# Patient Record
Sex: FEMALE | Race: BLACK OR AFRICAN AMERICAN | NOT HISPANIC OR LATINO
[De-identification: names, ages, dates, MRNs, and addresses within clinical notes are randomized per-mention and may not be internally consistent; named-entity substitution may affect disease eponyms.]

---

## 2017-01-11 ENCOUNTER — FORM ENCOUNTER (OUTPATIENT)
Age: 62
End: 2017-01-11

## 2017-01-12 ENCOUNTER — MEDICATION RENEWAL (OUTPATIENT)
Age: 62
End: 2017-01-12

## 2017-01-12 ENCOUNTER — OUTPATIENT (OUTPATIENT)
Dept: OUTPATIENT SERVICES | Facility: HOSPITAL | Age: 62
LOS: 1 days | End: 2017-01-12
Payer: COMMERCIAL

## 2017-01-12 ENCOUNTER — APPOINTMENT (OUTPATIENT)
Dept: MRI IMAGING | Facility: IMAGING CENTER | Age: 62
End: 2017-01-12

## 2017-01-12 ENCOUNTER — APPOINTMENT (OUTPATIENT)
Dept: INTERNAL MEDICINE | Facility: CLINIC | Age: 62
End: 2017-01-12

## 2017-01-12 VITALS
BODY MASS INDEX: 46.25 KG/M2 | HEART RATE: 66 BPM | DIASTOLIC BLOOD PRESSURE: 88 MMHG | HEIGHT: 63 IN | WEIGHT: 261 LBS | SYSTOLIC BLOOD PRESSURE: 146 MMHG

## 2017-01-12 DIAGNOSIS — G62.9 POLYNEUROPATHY, UNSPECIFIED: ICD-10-CM

## 2017-01-12 DIAGNOSIS — M54.16 RADICULOPATHY, LUMBAR REGION: ICD-10-CM

## 2017-01-12 PROCEDURE — 72148 MRI LUMBAR SPINE W/O DYE: CPT

## 2017-02-19 ENCOUNTER — FORM ENCOUNTER (OUTPATIENT)
Age: 62
End: 2017-02-19

## 2017-02-20 ENCOUNTER — APPOINTMENT (OUTPATIENT)
Dept: MRI IMAGING | Facility: CLINIC | Age: 62
End: 2017-02-20

## 2017-02-20 ENCOUNTER — OUTPATIENT (OUTPATIENT)
Dept: OUTPATIENT SERVICES | Facility: HOSPITAL | Age: 62
LOS: 1 days | End: 2017-02-20
Payer: COMMERCIAL

## 2017-02-20 DIAGNOSIS — M54.12 RADICULOPATHY, CERVICAL REGION: ICD-10-CM

## 2017-02-22 ENCOUNTER — RX RENEWAL (OUTPATIENT)
Age: 62
End: 2017-02-22

## 2017-02-24 ENCOUNTER — MESSAGE (OUTPATIENT)
Age: 62
End: 2017-02-24

## 2017-03-16 ENCOUNTER — RX RENEWAL (OUTPATIENT)
Age: 62
End: 2017-03-16

## 2017-03-21 ENCOUNTER — MESSAGE (OUTPATIENT)
Age: 62
End: 2017-03-21

## 2017-03-21 ENCOUNTER — MEDICATION RENEWAL (OUTPATIENT)
Age: 62
End: 2017-03-21

## 2017-04-13 PROCEDURE — 72141 MRI NECK SPINE W/O DYE: CPT

## 2017-06-30 ENCOUNTER — RX RENEWAL (OUTPATIENT)
Age: 62
End: 2017-06-30

## 2017-07-24 ENCOUNTER — APPOINTMENT (OUTPATIENT)
Dept: CT IMAGING | Facility: IMAGING CENTER | Age: 62
End: 2017-07-24

## 2017-07-24 ENCOUNTER — OUTPATIENT (OUTPATIENT)
Dept: OUTPATIENT SERVICES | Facility: HOSPITAL | Age: 62
LOS: 1 days | End: 2017-07-24
Payer: COMMERCIAL

## 2017-07-24 DIAGNOSIS — I67.1 CEREBRAL ANEURYSM, NONRUPTURED: ICD-10-CM

## 2017-07-24 PROCEDURE — 82565 ASSAY OF CREATININE: CPT

## 2017-07-24 PROCEDURE — 70496 CT ANGIOGRAPHY HEAD: CPT

## 2017-09-13 ENCOUNTER — RX RENEWAL (OUTPATIENT)
Age: 62
End: 2017-09-13

## 2017-09-15 ENCOUNTER — MEDICATION RENEWAL (OUTPATIENT)
Age: 62
End: 2017-09-15

## 2017-09-21 ENCOUNTER — MEDICATION RENEWAL (OUTPATIENT)
Age: 62
End: 2017-09-21

## 2017-10-11 ENCOUNTER — MEDICATION RENEWAL (OUTPATIENT)
Age: 62
End: 2017-10-11

## 2018-01-10 ENCOUNTER — MESSAGE (OUTPATIENT)
Age: 63
End: 2018-01-10

## 2018-01-29 ENCOUNTER — MEDICATION RENEWAL (OUTPATIENT)
Age: 63
End: 2018-01-29

## 2018-04-20 ENCOUNTER — RX RENEWAL (OUTPATIENT)
Age: 63
End: 2018-04-20

## 2018-05-04 ENCOUNTER — INPATIENT (INPATIENT)
Facility: HOSPITAL | Age: 63
LOS: 5 days | Discharge: HOME HEALTH | End: 2018-05-10
Attending: SURGERY | Admitting: SURGERY
Payer: COMMERCIAL

## 2018-05-04 VITALS
OXYGEN SATURATION: 98 % | SYSTOLIC BLOOD PRESSURE: 157 MMHG | TEMPERATURE: 98 F | DIASTOLIC BLOOD PRESSURE: 107 MMHG | RESPIRATION RATE: 16 BRPM | HEART RATE: 87 BPM

## 2018-05-04 DIAGNOSIS — K46.0 UNSPECIFIED ABDOMINAL HERNIA WITH OBSTRUCTION, WITHOUT GANGRENE: ICD-10-CM

## 2018-05-04 LAB
ALBUMIN SERPL ELPH-MCNC: 4.2 G/DL — SIGNIFICANT CHANGE UP (ref 3.3–5)
ALP SERPL-CCNC: 93 U/L — SIGNIFICANT CHANGE UP (ref 40–120)
ALT FLD-CCNC: 30 U/L — SIGNIFICANT CHANGE UP (ref 4–33)
APPEARANCE UR: CLEAR — SIGNIFICANT CHANGE UP
APTT BLD: 31.2 SEC — SIGNIFICANT CHANGE UP (ref 27.5–37.4)
AST SERPL-CCNC: 41 U/L — HIGH (ref 4–32)
BASE EXCESS BLDV CALC-SCNC: 3.5 MMOL/L — SIGNIFICANT CHANGE UP
BASOPHILS # BLD AUTO: 0.05 K/UL — SIGNIFICANT CHANGE UP (ref 0–0.2)
BASOPHILS NFR BLD AUTO: 0.4 % — SIGNIFICANT CHANGE UP (ref 0–2)
BILIRUB SERPL-MCNC: 0.4 MG/DL — SIGNIFICANT CHANGE UP (ref 0.2–1.2)
BILIRUB UR-MCNC: NEGATIVE — SIGNIFICANT CHANGE UP
BLD GP AB SCN SERPL QL: NEGATIVE — SIGNIFICANT CHANGE UP
BLOOD GAS VENOUS - CREATININE: 0.67 MG/DL — SIGNIFICANT CHANGE UP (ref 0.5–1.3)
BLOOD UR QL VISUAL: NEGATIVE — SIGNIFICANT CHANGE UP
BUN SERPL-MCNC: 12 MG/DL — SIGNIFICANT CHANGE UP (ref 7–23)
CALCIUM SERPL-MCNC: 8.7 MG/DL — SIGNIFICANT CHANGE UP (ref 8.4–10.5)
CHLORIDE BLDV-SCNC: 106 MMOL/L — SIGNIFICANT CHANGE UP (ref 96–108)
CHLORIDE SERPL-SCNC: 100 MMOL/L — SIGNIFICANT CHANGE UP (ref 98–107)
CO2 SERPL-SCNC: 24 MMOL/L — SIGNIFICANT CHANGE UP (ref 22–31)
COLOR SPEC: YELLOW — SIGNIFICANT CHANGE UP
CREAT SERPL-MCNC: 0.73 MG/DL — SIGNIFICANT CHANGE UP (ref 0.5–1.3)
EOSINOPHIL # BLD AUTO: 0.19 K/UL — SIGNIFICANT CHANGE UP (ref 0–0.5)
EOSINOPHIL NFR BLD AUTO: 1.5 % — SIGNIFICANT CHANGE UP (ref 0–6)
GAS PNL BLDV: 138 MMOL/L — SIGNIFICANT CHANGE UP (ref 136–146)
GLUCOSE BLDV-MCNC: 117 — HIGH (ref 70–99)
GLUCOSE SERPL-MCNC: 111 MG/DL — HIGH (ref 70–99)
GLUCOSE UR-MCNC: NEGATIVE — SIGNIFICANT CHANGE UP
HCO3 BLDV-SCNC: 27 MMOL/L — SIGNIFICANT CHANGE UP (ref 20–27)
HCT VFR BLD CALC: 43 % — SIGNIFICANT CHANGE UP (ref 34.5–45)
HCT VFR BLDV CALC: 41.9 % — SIGNIFICANT CHANGE UP (ref 34.5–45)
HGB BLD-MCNC: 13.2 G/DL — SIGNIFICANT CHANGE UP (ref 11.5–15.5)
HGB BLDV-MCNC: 13.6 G/DL — SIGNIFICANT CHANGE UP (ref 11.5–15.5)
IMM GRANULOCYTES # BLD AUTO: 0.05 # — SIGNIFICANT CHANGE UP
IMM GRANULOCYTES NFR BLD AUTO: 0.4 % — SIGNIFICANT CHANGE UP (ref 0–1.5)
INR BLD: 1.04 — SIGNIFICANT CHANGE UP (ref 0.88–1.17)
KETONES UR-MCNC: NEGATIVE — SIGNIFICANT CHANGE UP
LACTATE BLDV-MCNC: 1.1 MMOL/L — SIGNIFICANT CHANGE UP (ref 0.5–2)
LEUKOCYTE ESTERASE UR-ACNC: NEGATIVE — SIGNIFICANT CHANGE UP
LYMPHOCYTES # BLD AUTO: 17.5 % — SIGNIFICANT CHANGE UP (ref 13–44)
LYMPHOCYTES # BLD AUTO: 2.25 K/UL — SIGNIFICANT CHANGE UP (ref 1–3.3)
MCHC RBC-ENTMCNC: 21.7 PG — LOW (ref 27–34)
MCHC RBC-ENTMCNC: 30.7 % — LOW (ref 32–36)
MCV RBC AUTO: 70.8 FL — LOW (ref 80–100)
MONOCYTES # BLD AUTO: 0.7 K/UL — SIGNIFICANT CHANGE UP (ref 0–0.9)
MONOCYTES NFR BLD AUTO: 5.5 % — SIGNIFICANT CHANGE UP (ref 2–14)
MUCOUS THREADS # UR AUTO: SIGNIFICANT CHANGE UP
NEUTROPHILS # BLD AUTO: 9.6 K/UL — HIGH (ref 1.8–7.4)
NEUTROPHILS NFR BLD AUTO: 74.7 % — SIGNIFICANT CHANGE UP (ref 43–77)
NITRITE UR-MCNC: NEGATIVE — SIGNIFICANT CHANGE UP
NON-SQ EPI CELLS # UR AUTO: <1 — SIGNIFICANT CHANGE UP
NRBC # FLD: 0 — SIGNIFICANT CHANGE UP
PCO2 BLDV: 44 MMHG — SIGNIFICANT CHANGE UP (ref 41–51)
PH BLDV: 7.42 PH — SIGNIFICANT CHANGE UP (ref 7.32–7.43)
PH UR: 6.5 — SIGNIFICANT CHANGE UP (ref 4.6–8)
PLATELET # BLD AUTO: 256 K/UL — SIGNIFICANT CHANGE UP (ref 150–400)
PMV BLD: 11 FL — SIGNIFICANT CHANGE UP (ref 7–13)
PO2 BLDV: 40 MMHG — SIGNIFICANT CHANGE UP (ref 35–40)
POTASSIUM BLDV-SCNC: 3.9 MMOL/L — SIGNIFICANT CHANGE UP (ref 3.4–4.5)
POTASSIUM SERPL-MCNC: 4 MMOL/L — SIGNIFICANT CHANGE UP (ref 3.5–5.3)
POTASSIUM SERPL-SCNC: 4 MMOL/L — SIGNIFICANT CHANGE UP (ref 3.5–5.3)
PROT SERPL-MCNC: 7.7 G/DL — SIGNIFICANT CHANGE UP (ref 6–8.3)
PROT UR-MCNC: 20 MG/DL — SIGNIFICANT CHANGE UP
PROTHROM AB SERPL-ACNC: 11.6 SEC — SIGNIFICANT CHANGE UP (ref 9.8–13.1)
RBC # BLD: 6.07 M/UL — HIGH (ref 3.8–5.2)
RBC # FLD: 18.6 % — HIGH (ref 10.3–14.5)
RBC CASTS # UR COMP ASSIST: SIGNIFICANT CHANGE UP (ref 0–?)
RH IG SCN BLD-IMP: POSITIVE — SIGNIFICANT CHANGE UP
SAO2 % BLDV: 71.2 % — SIGNIFICANT CHANGE UP (ref 60–85)
SODIUM SERPL-SCNC: 139 MMOL/L — SIGNIFICANT CHANGE UP (ref 135–145)
SP GR SPEC: 1.02 — SIGNIFICANT CHANGE UP (ref 1–1.04)
SQUAMOUS # UR AUTO: SIGNIFICANT CHANGE UP
UROBILINOGEN FLD QL: NORMAL MG/DL — SIGNIFICANT CHANGE UP
WBC # BLD: 12.84 K/UL — HIGH (ref 3.8–10.5)
WBC # FLD AUTO: 12.84 K/UL — HIGH (ref 3.8–10.5)
WBC UR QL: SIGNIFICANT CHANGE UP (ref 0–?)

## 2018-05-04 PROCEDURE — 72192 CT PELVIS W/O DYE: CPT | Mod: 26,59

## 2018-05-04 PROCEDURE — 74177 CT ABD & PELVIS W/CONTRAST: CPT | Mod: 26

## 2018-05-04 RX ORDER — HEPARIN SODIUM 5000 [USP'U]/ML
5000 INJECTION INTRAVENOUS; SUBCUTANEOUS EVERY 8 HOURS
Qty: 0 | Refills: 0 | Status: DISCONTINUED | OUTPATIENT
Start: 2018-05-04 | End: 2018-05-07

## 2018-05-04 RX ORDER — ACETAMINOPHEN 500 MG
1000 TABLET ORAL ONCE
Qty: 0 | Refills: 0 | Status: COMPLETED | OUTPATIENT
Start: 2018-05-04 | End: 2018-05-04

## 2018-05-04 RX ORDER — HYDROCHLOROTHIAZIDE 25 MG
12.5 TABLET ORAL DAILY
Qty: 0 | Refills: 0 | Status: DISCONTINUED | OUTPATIENT
Start: 2018-05-04 | End: 2018-05-07

## 2018-05-04 RX ORDER — SODIUM CHLORIDE 9 MG/ML
1000 INJECTION INTRAMUSCULAR; INTRAVENOUS; SUBCUTANEOUS ONCE
Qty: 0 | Refills: 0 | Status: COMPLETED | OUTPATIENT
Start: 2018-05-04 | End: 2018-05-04

## 2018-05-04 RX ORDER — AMLODIPINE BESYLATE 2.5 MG/1
5 TABLET ORAL DAILY
Qty: 0 | Refills: 0 | Status: DISCONTINUED | OUTPATIENT
Start: 2018-05-04 | End: 2018-05-05

## 2018-05-04 RX ORDER — SODIUM CHLORIDE 9 MG/ML
1000 INJECTION, SOLUTION INTRAVENOUS
Qty: 0 | Refills: 0 | Status: DISCONTINUED | OUTPATIENT
Start: 2018-05-04 | End: 2018-05-05

## 2018-05-04 RX ORDER — PIPERACILLIN AND TAZOBACTAM 4; .5 G/20ML; G/20ML
3.38 INJECTION, POWDER, LYOPHILIZED, FOR SOLUTION INTRAVENOUS ONCE
Qty: 0 | Refills: 0 | Status: COMPLETED | OUTPATIENT
Start: 2018-05-04 | End: 2018-05-04

## 2018-05-04 RX ADMIN — Medication 1000 MILLIGRAM(S): at 23:29

## 2018-05-04 RX ADMIN — SODIUM CHLORIDE 500 MILLILITER(S): 9 INJECTION INTRAMUSCULAR; INTRAVENOUS; SUBCUTANEOUS at 13:21

## 2018-05-04 RX ADMIN — PIPERACILLIN AND TAZOBACTAM 200 GRAM(S): 4; .5 INJECTION, POWDER, LYOPHILIZED, FOR SOLUTION INTRAVENOUS at 20:20

## 2018-05-04 RX ADMIN — Medication 400 MILLIGRAM(S): at 20:04

## 2018-05-04 NOTE — ED PROVIDER NOTE - MEDICAL DECISION MAKING DETAILS
pt with abd hernia  tender to palpation  not reducible on my exam  labs including lactate sent  CT ordered   surgery called to evaluate (and they did prior to CT)  CT c/w incarcerated hernia  IV antibiotic given  surgery called to reevaluate

## 2018-05-04 NOTE — H&P ADULT - ATTENDING COMMENTS
pt seen and examined. hernia partially reduced on rpt ct with descreased pain and +BM/Flatus.  admit for observation, npo, ivf, tentative or mon for VHR.  re-evaluate for emergent repair if pain worsens or signs of recurrent LBO.

## 2018-05-04 NOTE — ED PROVIDER NOTE - OBJECTIVE STATEMENT
Pt with chronic hernia  low abd with mild baseline discomfort which became more intense over the last 2 days  She denies vomiting or fever and still is moving her bowels  She has no cardiopulmonary complaints   She denies radiation of pain to the back and denies recent straining that may have precipitated the pain

## 2018-05-04 NOTE — H&P ADULT - HISTORY OF PRESENT ILLNESS
63 yo female presents to the ER with abdominal pain. She states that over the past 4 days she has had worsening abdominal pain. She has had pain to a lesser extent in the past. Normally she is able to massage her stomach and the pain improves. Over the past couple of days the pain has gotten substantially worse. She last had a bowel movement and flatus on Wednesday. She denies fevers or chills. She has had no nausea or vomiting, however does note decreased PO intake.

## 2018-05-04 NOTE — ED ADULT TRIAGE NOTE - CHIEF COMPLAINT QUOTE
pt c/o abdominal pain and distention x 4-5 days worse since yesterday, decreased po intake, intermittent nausea, last bowel movement 2 days ago. denies any fevers, vomiting, diarrhea, chest pain. pmhx: neuropathy, htn, cad, , possible hernia pt c/o abdominal pain and distention x 4-5 days worse since yesterday, decreased po intake, intermittent nausea, last bowel movement 2 days ago. denies any fevers, vomiting, diarrhea, chest pain. pmhx: neuropathy, htn, cad, , possible hernia.

## 2018-05-04 NOTE — ED PROVIDER NOTE - PROGRESS NOTE DETAILS
MARIELA Bro - pt signed out to me at the shift change. incarcerated hernia, reduced by surgery, they want repeat Ct abd/pelvis non-con - called CT upstairs, will take pt now.

## 2018-05-04 NOTE — H&P ADULT - NSHPLABSRESULTS_GEN_ALL_CORE
CBC Full  -  ( 04 May 2018 13:12 )  WBC Count : 12.84 K/uL  Hemoglobin : 13.2 g/dL  Hematocrit : 43.0 %  Platelet Count - Automated : 256 K/uL  Mean Cell Volume : 70.8 fL  Mean Cell Hemoglobin : 21.7 pg  Mean Cell Hemoglobin Concentration : 30.7 %  Auto Neutrophil # : 9.60 K/uL  Auto Lymphocyte # : 2.25 K/uL  Auto Monocyte # : 0.70 K/uL  Auto Eosinophil # : 0.19 K/uL  Auto Basophil # : 0.05 K/uL  Auto Neutrophil % : 74.7 %  Auto Lymphocyte % : 17.5 %  Auto Monocyte % : 5.5 %  Auto Eosinophil % : 1.5 %  Auto Basophil % : 0.4 %      05-04    139  |  100  |  12  ----------------------------<  111<H>  4.0   |  24  |  0.73    Ca    8.7      04 May 2018 13:12    TPro  7.7  /  Alb  4.2  /  TBili  0.4  /  DBili  x   /  AST  41<H>  /  ALT  30  /  AlkPhos  93  05-04    CT abdomen/pelvis:   There is fluid stranding along the herniated portions of the transverse colon. There is complete collapse of the distal colon to the level of the rectum. Transition to nondistended colon is noted at the level of the hernia (series 601 image 110).

## 2018-05-04 NOTE — ED ADULT NURSE NOTE - OBJECTIVE STATEMENT
pt A&Ox3 c/o left lower quadrant pain pt states she has a hernia and it has been increasing in size, pt reports decreased appteitie, abdomen soft and tender to touch, denies n/v/d. pt sent to rw

## 2018-05-04 NOTE — ED ADULT NURSE NOTE - CHIEF COMPLAINT QUOTE
pt c/o abdominal pain and distention x 4-5 days worse since yesterday, decreased po intake, intermittent nausea, last bowel movement 2 days ago. denies any fevers, vomiting, diarrhea, chest pain. pmhx: neuropathy, htn, cad, , possible hernia.

## 2018-05-04 NOTE — H&P ADULT - NSHPPHYSICALEXAM_GEN_ALL_CORE
Gen: NAD  HEENT: no scleral injection, EOMI, no neck masses  CV: S1/S2  Resp: clear to auscultation bilaterally  Abdomen: soft, bulge over left periumbilical region, tenderness, no erythema  Ext: warm well perfused

## 2018-05-05 LAB
BACTERIA UR CULT: SIGNIFICANT CHANGE UP
BLD GP AB SCN SERPL QL: NEGATIVE — SIGNIFICANT CHANGE UP
BUN SERPL-MCNC: 9 MG/DL — SIGNIFICANT CHANGE UP (ref 7–23)
CALCIUM SERPL-MCNC: 8.4 MG/DL — SIGNIFICANT CHANGE UP (ref 8.4–10.5)
CHLORIDE SERPL-SCNC: 102 MMOL/L — SIGNIFICANT CHANGE UP (ref 98–107)
CO2 SERPL-SCNC: 26 MMOL/L — SIGNIFICANT CHANGE UP (ref 22–31)
CREAT SERPL-MCNC: 0.71 MG/DL — SIGNIFICANT CHANGE UP (ref 0.5–1.3)
GLUCOSE SERPL-MCNC: 100 MG/DL — HIGH (ref 70–99)
HCT VFR BLD CALC: 39.7 % — SIGNIFICANT CHANGE UP (ref 34.5–45)
HGB BLD-MCNC: 12.1 G/DL — SIGNIFICANT CHANGE UP (ref 11.5–15.5)
LACTATE SERPL-SCNC: 0.8 MMOL/L — SIGNIFICANT CHANGE UP (ref 0.5–2)
MCHC RBC-ENTMCNC: 21.8 PG — LOW (ref 27–34)
MCHC RBC-ENTMCNC: 30.5 % — LOW (ref 32–36)
MCV RBC AUTO: 71.7 FL — LOW (ref 80–100)
NRBC # FLD: 0 — SIGNIFICANT CHANGE UP
PLATELET # BLD AUTO: 233 K/UL — SIGNIFICANT CHANGE UP (ref 150–400)
PMV BLD: 11.8 FL — SIGNIFICANT CHANGE UP (ref 7–13)
POTASSIUM SERPL-MCNC: 3.6 MMOL/L — SIGNIFICANT CHANGE UP (ref 3.5–5.3)
POTASSIUM SERPL-SCNC: 3.6 MMOL/L — SIGNIFICANT CHANGE UP (ref 3.5–5.3)
RBC # BLD: 5.54 M/UL — HIGH (ref 3.8–5.2)
RBC # FLD: 17.4 % — HIGH (ref 10.3–14.5)
RH IG SCN BLD-IMP: POSITIVE — SIGNIFICANT CHANGE UP
SODIUM SERPL-SCNC: 139 MMOL/L — SIGNIFICANT CHANGE UP (ref 135–145)
SPECIMEN SOURCE: SIGNIFICANT CHANGE UP
WBC # BLD: 10.93 K/UL — HIGH (ref 3.8–10.5)
WBC # FLD AUTO: 10.93 K/UL — HIGH (ref 3.8–10.5)

## 2018-05-05 RX ORDER — AMLODIPINE BESYLATE 2.5 MG/1
5 TABLET ORAL
Qty: 0 | Refills: 0 | Status: DISCONTINUED | OUTPATIENT
Start: 2018-05-05 | End: 2018-05-07

## 2018-05-05 RX ORDER — ACETAMINOPHEN 500 MG
650 TABLET ORAL ONCE
Qty: 0 | Refills: 0 | Status: COMPLETED | OUTPATIENT
Start: 2018-05-05 | End: 2018-05-05

## 2018-05-05 RX ORDER — GABAPENTIN 400 MG/1
300 CAPSULE ORAL
Qty: 0 | Refills: 0 | Status: DISCONTINUED | OUTPATIENT
Start: 2018-05-05 | End: 2018-05-07

## 2018-05-05 RX ORDER — POTASSIUM CHLORIDE 20 MEQ
10 PACKET (EA) ORAL ONCE
Qty: 0 | Refills: 0 | Status: COMPLETED | OUTPATIENT
Start: 2018-05-05 | End: 2018-05-05

## 2018-05-05 RX ORDER — GABAPENTIN 400 MG/1
100 CAPSULE ORAL ONCE
Qty: 0 | Refills: 0 | Status: COMPLETED | OUTPATIENT
Start: 2018-05-05 | End: 2018-05-05

## 2018-05-05 RX ORDER — GABAPENTIN 400 MG/1
100 CAPSULE ORAL THREE TIMES A DAY
Qty: 0 | Refills: 0 | Status: DISCONTINUED | OUTPATIENT
Start: 2018-05-05 | End: 2018-05-05

## 2018-05-05 RX ORDER — ACETAMINOPHEN 500 MG
1000 TABLET ORAL ONCE
Qty: 0 | Refills: 0 | Status: COMPLETED | OUTPATIENT
Start: 2018-05-05 | End: 2018-05-05

## 2018-05-05 RX ORDER — GABAPENTIN 400 MG/1
1 CAPSULE ORAL
Qty: 0 | Refills: 0 | COMMUNITY

## 2018-05-05 RX ADMIN — AMLODIPINE BESYLATE 5 MILLIGRAM(S): 2.5 TABLET ORAL at 05:18

## 2018-05-05 RX ADMIN — Medication 650 MILLIGRAM(S): at 23:03

## 2018-05-05 RX ADMIN — Medication 650 MILLIGRAM(S): at 22:28

## 2018-05-05 RX ADMIN — HEPARIN SODIUM 5000 UNIT(S): 5000 INJECTION INTRAVENOUS; SUBCUTANEOUS at 05:18

## 2018-05-05 RX ADMIN — Medication 100 MILLIEQUIVALENT(S): at 17:34

## 2018-05-05 RX ADMIN — GABAPENTIN 300 MILLIGRAM(S): 400 CAPSULE ORAL at 17:33

## 2018-05-05 RX ADMIN — Medication 1000 MILLIGRAM(S): at 10:00

## 2018-05-05 RX ADMIN — SODIUM CHLORIDE 75 MILLILITER(S): 9 INJECTION, SOLUTION INTRAVENOUS at 17:34

## 2018-05-05 RX ADMIN — SODIUM CHLORIDE 125 MILLILITER(S): 9 INJECTION, SOLUTION INTRAVENOUS at 09:41

## 2018-05-05 RX ADMIN — Medication 12.5 MILLIGRAM(S): at 05:18

## 2018-05-05 RX ADMIN — GABAPENTIN 300 MILLIGRAM(S): 400 CAPSULE ORAL at 05:18

## 2018-05-05 RX ADMIN — GABAPENTIN 100 MILLIGRAM(S): 400 CAPSULE ORAL at 01:06

## 2018-05-05 RX ADMIN — Medication 400 MILLIGRAM(S): at 09:41

## 2018-05-05 RX ADMIN — SODIUM CHLORIDE 125 MILLILITER(S): 9 INJECTION, SOLUTION INTRAVENOUS at 00:11

## 2018-05-05 NOTE — PATIENT PROFILE ADULT. - MEDICATION ADMINISTRATION INFO, PROFILE
no concerns
DVT: HSQ  Diet: NPO with PEG feeds.  GOC: Spoke to family about prognosis, would want full code.

## 2018-05-05 NOTE — PROVIDER CONTACT NOTE (OTHER) - ASSESSMENT
Pt complaining of neuropathic pain on bilateral hands and feet. Pt requesting home dose of gabapentin 300mg. Otherwise asymptomatic.

## 2018-05-05 NOTE — PROGRESS NOTE ADULT - SUBJECTIVE AND OBJECTIVE BOX
GENERAL SURGERY DAILY PROGRESS NOTE      Subjective:  Admitted overnight for incarcerated ventral hernia. Hernia reduced at bedside. This AM pt feels well overall. States pain has improved since admission. Passed flatus this AM.         Objective:    PE:  Gen: Laying in bed, in NAD  Resp: clear to auscultation bilaterally  Abdomen: soft, bulge over left periumbilical region, tenderness, no erythema  Ext: warm well perfused    Vital Signs Last 24 Hrs  T(C): 37.1 (05 May 2018 05:15), Max: 37.6 (05 May 2018 00:06)  T(F): 98.7 (05 May 2018 05:15), Max: 99.6 (05 May 2018 00:06)  HR: 65 (05 May 2018 05:15) (65 - 91)  BP: 136/90 (05 May 2018 05:15) (134/92 - 166/76)  BP(mean): --  RR: 18 (05 May 2018 05:15) (16 - 18)  SpO2: 97% (05 May 2018 05:15) (97% - 100%)    I&O's Detail    04 May 2018 07:01  -  05 May 2018 07:00  --------------------------------------------------------  IN:    lactated ringers.: 750 mL    Oral Fluid: 100 mL  Total IN: 850 mL    OUT:    Voided: 400 mL  Total OUT: 400 mL    Total NET: 450 mL          Daily Height in cm: 162.56 (05 May 2018 00:06)    Daily     MEDICATIONS  (STANDING):  amLODIPine   Tablet 5 milliGRAM(s) Oral daily  gabapentin 300 milliGRAM(s) Oral two times a day  heparin  Injectable 5000 Unit(s) SubCutaneous every 8 hours  hydrochlorothiazide 12.5 milliGRAM(s) Oral daily  lactated ringers. 1000 milliLiter(s) (125 mL/Hr) IV Continuous <Continuous>    MEDICATIONS  (PRN):      LABS:                        12.1   10.93 )-----------( 233      ( 05 May 2018 06:00 )             39.7     05-04    139  |  100  |  12  ----------------------------<  111<H>  4.0   |  24  |  0.73    Ca    8.7      04 May 2018 13:12    TPro  7.7  /  Alb  4.2  /  TBili  0.4  /  DBili  x   /  AST  41<H>  /  ALT  30  /  AlkPhos  93  05-04    PT/INR - ( 04 May 2018 20:00 )   PT: 11.6 SEC;   INR: 1.04          PTT - ( 04 May 2018 20:00 )  PTT:31.2 SEC  Urinalysis Basic - ( 04 May 2018 13:12 )    Color: YELLOW / Appearance: CLEAR / S.025 / pH: 6.5  Gluc: NEGATIVE / Ketone: NEGATIVE  / Bili: NEGATIVE / Urobili: NORMAL mg/dL   Blood: NEGATIVE / Protein: 20 mg/dL / Nitrite: NEGATIVE   Leuk Esterase: NEGATIVE / RBC: 0-2 / WBC 2-5   Sq Epi: OCC / Non Sq Epi: x / Bacteria: x        RADIOLOGY & ADDITIONAL STUDIES:

## 2018-05-05 NOTE — PROGRESS NOTE ADULT - ASSESSMENT
61 yo female s/p 4 c-sections with incarcerated ventral hernia, s/p bedside reduction, currently hemodynamically stable  - Monitor GI function  - Continue NPO/IVF for now  - F/U AM labs  - DVT ppx 61 yo female s/p 4 c-sections with incarcerated ventral hernia, s/p bedside reduction, currently hemodynamically stable  - Monitor GI function  - advance to clears for now  - F/U AM labs  - DVT ppx  - plan for OR mon for chronically incarcerated VHR unless pain worsens or recurrent signs of LBO

## 2018-05-06 ENCOUNTER — TRANSCRIPTION ENCOUNTER (OUTPATIENT)
Age: 63
End: 2018-05-06

## 2018-05-06 LAB
BUN SERPL-MCNC: 14 MG/DL — SIGNIFICANT CHANGE UP (ref 7–23)
CALCIUM SERPL-MCNC: 8.8 MG/DL — SIGNIFICANT CHANGE UP (ref 8.4–10.5)
CHLORIDE SERPL-SCNC: 101 MMOL/L — SIGNIFICANT CHANGE UP (ref 98–107)
CO2 SERPL-SCNC: 28 MMOL/L — SIGNIFICANT CHANGE UP (ref 22–31)
CREAT SERPL-MCNC: 0.79 MG/DL — SIGNIFICANT CHANGE UP (ref 0.5–1.3)
GLUCOSE SERPL-MCNC: 102 MG/DL — HIGH (ref 70–99)
HCT VFR BLD CALC: 40.2 % — SIGNIFICANT CHANGE UP (ref 34.5–45)
HGB BLD-MCNC: 12.3 G/DL — SIGNIFICANT CHANGE UP (ref 11.5–15.5)
MAGNESIUM SERPL-MCNC: 2.2 MG/DL — SIGNIFICANT CHANGE UP (ref 1.6–2.6)
MCHC RBC-ENTMCNC: 21.9 PG — LOW (ref 27–34)
MCHC RBC-ENTMCNC: 30.6 % — LOW (ref 32–36)
MCV RBC AUTO: 71.7 FL — LOW (ref 80–100)
NRBC # FLD: 0 — SIGNIFICANT CHANGE UP
PHOSPHATE SERPL-MCNC: 4.7 MG/DL — HIGH (ref 2.5–4.5)
PLATELET # BLD AUTO: 233 K/UL — SIGNIFICANT CHANGE UP (ref 150–400)
PMV BLD: 11.1 FL — SIGNIFICANT CHANGE UP (ref 7–13)
POTASSIUM SERPL-MCNC: 3.8 MMOL/L — SIGNIFICANT CHANGE UP (ref 3.5–5.3)
POTASSIUM SERPL-SCNC: 3.8 MMOL/L — SIGNIFICANT CHANGE UP (ref 3.5–5.3)
RBC # BLD: 5.61 M/UL — HIGH (ref 3.8–5.2)
RBC # FLD: 18.4 % — HIGH (ref 10.3–14.5)
SODIUM SERPL-SCNC: 141 MMOL/L — SIGNIFICANT CHANGE UP (ref 135–145)
WBC # BLD: 11.15 K/UL — HIGH (ref 3.8–10.5)
WBC # FLD AUTO: 11.15 K/UL — HIGH (ref 3.8–10.5)

## 2018-05-06 PROCEDURE — 71045 X-RAY EXAM CHEST 1 VIEW: CPT | Mod: 26

## 2018-05-06 PROCEDURE — 99223 1ST HOSP IP/OBS HIGH 75: CPT | Mod: AI,GC

## 2018-05-06 RX ORDER — ACETAMINOPHEN 500 MG
650 TABLET ORAL EVERY 6 HOURS
Qty: 0 | Refills: 0 | Status: DISCONTINUED | OUTPATIENT
Start: 2018-05-06 | End: 2018-05-07

## 2018-05-06 RX ORDER — SODIUM CHLORIDE 9 MG/ML
1000 INJECTION, SOLUTION INTRAVENOUS
Qty: 0 | Refills: 0 | Status: DISCONTINUED | OUTPATIENT
Start: 2018-05-06 | End: 2018-05-07

## 2018-05-06 RX ADMIN — Medication 650 MILLIGRAM(S): at 15:43

## 2018-05-06 RX ADMIN — Medication 12.5 MILLIGRAM(S): at 05:26

## 2018-05-06 RX ADMIN — HEPARIN SODIUM 5000 UNIT(S): 5000 INJECTION INTRAVENOUS; SUBCUTANEOUS at 15:25

## 2018-05-06 RX ADMIN — AMLODIPINE BESYLATE 5 MILLIGRAM(S): 2.5 TABLET ORAL at 18:04

## 2018-05-06 RX ADMIN — Medication 650 MILLIGRAM(S): at 15:23

## 2018-05-06 RX ADMIN — HEPARIN SODIUM 5000 UNIT(S): 5000 INJECTION INTRAVENOUS; SUBCUTANEOUS at 05:26

## 2018-05-06 RX ADMIN — GABAPENTIN 300 MILLIGRAM(S): 400 CAPSULE ORAL at 18:04

## 2018-05-06 RX ADMIN — GABAPENTIN 300 MILLIGRAM(S): 400 CAPSULE ORAL at 05:26

## 2018-05-06 RX ADMIN — AMLODIPINE BESYLATE 5 MILLIGRAM(S): 2.5 TABLET ORAL at 05:26

## 2018-05-06 RX ADMIN — HEPARIN SODIUM 5000 UNIT(S): 5000 INJECTION INTRAVENOUS; SUBCUTANEOUS at 22:03

## 2018-05-06 NOTE — CONSULT NOTE ADULT - SUBJECTIVE AND OBJECTIVE BOX
Date of Admission:    Patient is a 62y old  Female who presents with a chief complaint of abdominal pain (04 May 2018 22:21)      HISTORY OF PRESENT ILLNESS:       Allergies    latex (Rash; Hives)  Percocet 10/325 (Hives)    Intolerances    	    MEDICATIONS:  Tylenol PRN  Amlodipine 5mg BID  Gabapentin 100mg TID  HCTZ/Losartan 12.5mg/50mg qd  Zolpidem ER 12.5mg qhs         Inpatient Meds:   amLODIPine   Tablet 5 milliGRAM(s) Oral <User Schedule>  heparin  Injectable 5000 Unit(s) SubCutaneous every 8 hours  hydrochlorothiazide 12.5 milliGRAM(s) Oral daily        gabapentin 300 milliGRAM(s) Oral two times a day        dextrose 5% + sodium chloride 0.3%. 1000 milliLiter(s) IV Continuous <Continuous>      PAST MEDICAL & SURGICAL HISTORY:  CAD (coronary artery disease)  Neuropathy of foot  Obesity  HTN (hypertension)  History of  section      FAMILY HISTORY:  No pertinent family history in first degree relatives      SOCIAL HISTORY:    [ ] Non-smoker  [ ] Smoker  [ ] Alcohol      REVIEW OF SYSTEMS:    CONSTITUTIONAL: No weakness, fevers or chills  EYES/ENT: No visual changes;  No dysphagia  NECK: No pain or stiffness  RESPIRATORY: No cough, wheezing, hemoptysis; No shortness of breath  CARDIOVASCULAR: No chest pain or palpitations; No lower extremity edema  GASTROINTESTINAL: No abdominal or epigastric pain. No nausea, vomiting, or hematemesis; No diarrhea or constipation. No melena or hematochezia.  BACK: No back pain  GENITOURINARY: No dysuria, frequency or hematuria  NEUROLOGICAL: No numbness or weakness  SKIN: No itching, burning, rashes, or lesions   All other review of systems is negative unless indicated above.  PHYSICAL EXAM:  T(C): 36.7 (18 @ 09:30), Max: 37.4 (18 @ 22:04)  HR: 89 (18 @ 09:30) (74 - 89)  BP: 151/86 (18 @ 09:30) (113/72 - 151/86)  RR: 18 (18 @ 09:30) (17 - 18)  SpO2: 96% (18 @ 09:30) (96% - 97%)  Wt(kg): --  I&O's Summary    05 May 2018 07:01  -  06 May 2018 07:00  --------------------------------------------------------  IN: 780 mL / OUT: 2250 mL / NET: -1470 mL        Appearance: Normal	  HEENT:   Normal oral mucosa, PERRL, EOMI	  Lymphatic: No lymphadenopathy  Cardiovascular: Normal S1 S2, No JVD, No murmurs, No edema  Respiratory: Lungs clear to auscultation	  Psychiatry: A & O x 3, Mood & affect appropriate  Gastrointestinal:  Soft, Non-tender, + BS	  Skin: No rashes, No ecchymoses, No cyanosis	  Neurologic: Non-focal  Extremities: Normal range of motion, No clubbing, cyanosis or edema  Vascular: Peripheral pulses palpable 2+ bilaterally        LABS:	 	    CBC Full  -  ( 06 May 2018 06:00 )  WBC Count : 11.15 K/uL  Hemoglobin : 12.3 g/dL  Hematocrit : 40.2 %  Platelet Count - Automated : 233 K/uL  Mean Cell Volume : 71.7 fL  Mean Cell Hemoglobin : 21.9 pg  Mean Cell Hemoglobin Concentration : 30.6 %  Auto Neutrophil # : x  Auto Lymphocyte # : x  Auto Monocyte # : x  Auto Eosinophil # : x  Auto Basophil # : x  Auto Neutrophil % : x  Auto Lymphocyte % : x  Auto Monocyte % : x  Auto Eosinophil % : x  Auto Basophil % : x        141  |  101  |  14  ----------------------------<  102<H>  3.8   |  28  |  0.79      139  |  102  |  9   ----------------------------<  100<H>  3.6   |  26  |  0.71    Ca    8.8      06 May 2018 06:00  Ca    8.4      05 May 2018 06:00  Phos  4.7     05-  Mg     2.2     05-06    TPro  7.7  /  Alb  4.2  /  TBili  0.4  /  DBili  x   /  AST  41<H>  /  ALT  30  /  AlkPhos  93        proBNP:   Lipid Profile:   HgA1c:   TSH:       CARDIAC MARKERS:            TELEMETRY: 	    ECG:  	  RADIOLOGY:  OTHER: 	    PREVIOUS DIAGNOSTIC TESTING:    [ ] Echocardiogram:  [ ]  Catheterization:  [ ] Stress Test:  	  	  ASSESSMENT/PLAN: Date of Admission:    Patient is a 62y old  Female who presents with a chief complaint of abdominal pain (04 May 2018 22:21)      HISTORY OF PRESENT ILLNESS:     61yo woman w/PMHx palpitations, HTN, neuropathy, Morbid Obesity, s/p vertical incision multiple C. sections p/w abdominal pain a/w incarcerated ventral incisional hernia s/p manual reduction with plan for OR  for ventral hernia repair cardiology consulted for preoperative risk assessment. Reports FATIMA at 1 flight of stairs and with minimal exertion, stable since last stress and cath. No associated CP, nausea/vomiting, lightheadedness. Occasional palpitations.         Allergies    latex (Rash; Hives)  Percocet 10/325 (Hives)    Intolerances    	    MEDICATIONS:  Tylenol PRN  Amlodipine 5mg BID  Gabapentin 100mg TID  HCTZ/Losartan 12.5mg/50mg qd  Zolpidem ER 12.5mg qhs         Inpatient Meds:   amLODIPine   Tablet 5 milliGRAM(s) Oral <User Schedule>  heparin  Injectable 5000 Unit(s) SubCutaneous every 8 hours  hydrochlorothiazide 12.5 milliGRAM(s) Oral daily  gabapentin 300 milliGRAM(s) Oral two times a day  dextrose 5% + sodium chloride 0.3%. 1000 milliLiter(s) IV Continuous <Continuous>      PAST MEDICAL & SURGICAL HISTORY:  Neuropathy of foot  Obesity  HTN (hypertension)  History of  section      FAMILY HISTORY:  No pertinent family history in first degree relatives      SOCIAL HISTORY:    [ ] Non-smoker        REVIEW OF SYSTEMS:    CONSTITUTIONAL: No weakness, fevers or chills  EYES/ENT: No visual changes;  No dysphagia  NECK: No pain or stiffness  RESPIRATORY: No cough, wheezing, hemoptysis;  CARDIOVASCULAR: No chest pain or palpitations; No lower extremity edema  GASTROINTESTINAL. No nausea, vomiting, or hematemesis; No diarrhea or constipation. No melena or hematochezia.  BACK: No back pain  GENITOURINARY: No dysuria, frequency or hematuria  NEUROLOGICAL: No numbness or weakness  SKIN: No itching, burning, rashes, or lesions   All other review of systems is negative unless indicated above.    PHYSICAL EXAM:  T(C): 36.7 (18 @ 09:30), Max: 37.4 (18 @ 22:04)  HR: 89 (18 @ 09:30) (74 - 89)  BP: 151/86 (18 @ 09:30) (113/72 - 151/86)  RR: 18 (18 @ 09:30) (17 - 18)  SpO2: 96% (18 @ 09:30) (96% - 97%)  Wt(kg): --  I&O's Summary    05 May 2018 07:01  -  06 May 2018 07:00  --------------------------------------------------------  IN: 780 mL / OUT: 2250 mL / NET: -1470 mL        Appearance: Normal, morbidly obese, resting comfortably in no distress	  HEENT:   Normal oral mucosa, PERRL, EOMI	  Lymphatic: No lymphadenopathy  Cardiovascular: Normal S1 S2, No JVD, No murmurs, No edema  Respiratory: Lungs clear to auscultation	  Psychiatry: A & O x 3, Mood & affect appropriate  Gastrointestinal:  Soft, Non-tender, + BS	  Skin: No rashes, No ecchymoses, No cyanosis	  Neurologic: Non-focal  Extremities: Normal range of motion, No clubbing, cyanosis or edema  Vascular: Peripheral pulses palpable 2+ bilaterally        LABS:	 	    CBC Full  -  ( 06 May 2018 06:00 )  WBC Count : 11.15 K/uL  Hemoglobin : 12.3 g/dL  Hematocrit : 40.2 %  Platelet Count - Automated : 233 K/uL  Mean Cell Volume : 71.7 fL  Mean Cell Hemoglobin : 21.9 pg  Mean Cell Hemoglobin Concentration : 30.6 %          141  |  101  |  14  ----------------------------<  102<H>  3.8   |  28  |  0.79      139  |  102  |  9   ----------------------------<  100<H>  3.6   |  26  |  0.71    Ca    8.8      06 May 2018 06:00  Ca    8.4      05 May 2018 06:00  Phos  4.7       Mg     2.2         TPro  7.7  /  Alb  4.2  /  TBili  0.4  /  DBili  x   /  AST  41<H>  /  ALT  30  /  AlkPhos  93  05-04      ECG:  	NSR HR 90 no acute ST changes    RADIOLOGY:   CT Abd/Pelvis; personally reviewed lower chest no noted effusions    PREVIOUS DIAGNOSTIC TESTING:    [ ] Echocardiogram: < from: Transthoracic Echocardiogram (02.19.15 @ 13:30) >  CONCLUSIONS:  1. Mitral annular calcification. Minimal mitral  regurgitation.  2. Normal trileaflet aortic valve. No aortic valve  regurgitation seen.  3. Normal left ventricular internal dimensions and wall  thickness.  4. Mild global left ventricular systolic dysfunction. EF  50%.  5. Normal right atrium.  6. Normal right ventricular size and function.  7. Estimated right ventricular systolic pressure equals 27  mm Hg, assuming right atrial pressure equals 10 mm Hg,  consistent with normal pulmonary pressures.  8. Minimal tricuspid regurgitation.      [ ]  Catheterization: < from: Cardiac Cath Lab (13 @ 09:00) >  CORONARY VESSELS: The coronary circulation is left dominant.  LM:   --  LM: Normal.  LAD:   --  LAD: Normal.  CX:   --  Circumflex: Normal.  RCA:   --  RCA: Normal.  COMPLICATIONS: There were no complications.  DIAGNOSTIC RECOMMENDATIONS: The patient should continue with the present  medications.  Prepared and signed by  Danny Duncan M.D.    	  ASSESSMENT/PLAN: 	  61yo woman w/PMHx palpitations, HTN, neuropathy, Morbid Obesity, s/p vertical incision multiple C. sections p/w abdominal pain a/w incarcerated ventral incisional hernia s/p manual reduction with plan for OR  for ventral hernia repair cardiology consulted for preoperative risk assessment.    RCRI 1 (given intraperitoneal surgery) Risk of major adverse cardiac events is <1%. Despite METS<4. No further cardiac evaluation is required prior to procedure. Last cath in  without CAD. Prior TTE demonstrated mild global LV dysfunction. No symptoms of CHF and no evidence of volume overload on exam  - HTN c/w antihypertensives    Will d/w Dr. Chavez

## 2018-05-06 NOTE — CONSULT NOTE ADULT - ASSESSMENT
62 y.o. female PMH HTN, neuropathy, Morbid Obesity, s/p vertical incision C-sections x 4 p/w abdominal pain a/w incarcerated ventral incisional hernia s/p reduction.  Patient is scheduled for ventral hernia repair Monday, May 7.    #  Pre-op Evaluation.  - Transthoracic Echocardiogram  - Given complicated and unclear cardiac history and poor exercise tolerance, Consider Cardiology clearance prior to surgery    Please reconsult with further questions.  Pager # [phone]

## 2018-05-06 NOTE — CONSULT NOTE ADULT - ATTENDING COMMENTS
Agree with above. May proceed with planned procedure.
62 y.o. Female w/ hx Obesity, HTN, subdural hematome 2015, small brain aneursym, CAD , ? Arrthymia, thalaseemia minor p/w incarcerated ventral hernia for surgery pending clearance. Patient gets SOB on a daily basis when ambulating up flight of stairs but no chest pain. She had a cardiac cath a few years ago which showed mild systolic dysfxn but clean coronaries. Would recommend Cardiology clearance since its unclear what the arrythmia she has is and EKG now shows sinus rhythm. Would check TTE prior to surgery.

## 2018-05-06 NOTE — CHART NOTE - NSCHARTNOTEFT_GEN_A_CORE
Surgery Preop Note    Patient is a 62y old  Female who presents with a chief complaint of abdominal pain (04 May 2018 22:21)    Diagnosis: Incarcerated ventral hernia  Procedure: Ventral hernia repair, possible mesh  Surgeon: Leon                          [id number]   11.15 )-----------( 233      ( 06 May 2018 06:00 )             40.2     05-06    141  |  101  |  14  ----------------------------<  102<H>  3.8   |  28  |  0.79    Ca    8.8      06 May 2018 06:00  Phos  4.7     05-  Mg     2.2     -06    TPro  7.7  /  Alb  4.2  /  TBili  0.4  /  DBili  x   /  AST  41<H>  /  ALT  30  /  AlkPhos  93  05-04    PT/INR - ( 04 May 2018 20:00 )   PT: 11.6 SEC;   INR: 1.04          PTT - ( 04 May 2018 20:00 )  PTT:31.2 SEC  ABO Interpretation: B ( @ 06:00)  ABO Interpretation: B ( @ 12:38)    Urinalysis Basic - ( 04 May 2018 13:12 )    Color: YELLOW / Appearance: CLEAR / S.025 / pH: 6.5  Gluc: NEGATIVE / Ketone: NEGATIVE  / Bili: NEGATIVE / Urobili: NORMAL mg/dL   Blood: NEGATIVE / Protein: 20 mg/dL / Nitrite: NEGATIVE   Leuk Esterase: NEGATIVE / RBC: 0-2 / WBC 2-5   Sq Epi: OCC / Non Sq Epi: x / Bacteria: x      Chest X RAY (18): Clear lung fields    12 Lead ECG (18 @ 11:06)   Normal sinus rhythm  Left axis deviation  Incomplete left bundle branch block           MEDICATIONS  (STANDING):  amLODIPine   Tablet 5 milliGRAM(s) Oral <User Schedule>  dextrose 5% + sodium chloride 0.3%. 1000 milliLiter(s) (75 mL/Hr) IV Continuous <Continuous>  gabapentin 300 milliGRAM(s) Oral two times a day  heparin  Injectable 5000 Unit(s) SubCutaneous every 8 hours  hydrochlorothiazide 12.5 milliGRAM(s) Oral daily    MEDICATIONS  (PRN):      Assessment & Plan:  62y Female PMH CAD, presented with incarcerated ventral hernia, currently hemodynamically stable  - NPOpMN/IVF  - T&S, Serum HCG  - Pre-op labs obtained  - Appreciate medical clearance  - Consent signed, in chart  - OR tomorrow for hernia repair

## 2018-05-06 NOTE — PROGRESS NOTE ADULT - SUBJECTIVE AND OBJECTIVE BOX
General Surgery Progress Note  AILIN ALLISON    S: No acute events overnight. Denies N/V and fevers/chills. Tolerated a regular diet yesterday.    O:  T(C): 36.9 (05-06-18 @ 02:01), Max: 37.4 (05-05-18 @ 22:04)  HR: 82 (05-06-18 @ 02:01) (65 - 85)  BP: 113/72 (05-06-18 @ 02:01) (113/72 - 143/82)  RR: 18 (05-06-18 @ 02:01) (17 - 18)  SpO2: 96% (05-06-18 @ 02:01) (96% - 98%)        Physical Exam:  Gen: Laying in bed, in NAD  Resp: clear to auscultation bilaterally  Abdomen: soft, bulge over left periumbilical region, tenderness, no erythema  Ext: warm well perfused      A/P: 61 yo female s/p 4 c-sections with incarcerated ventral hernia, s/p bedside reduction, currently hemodynamically stable    - Regular diet: NPOpMN for OR, IVF  - Consent in chart  - Will obtain medical clearance today, will call PMD  - Monitor GI fxn, monitor for signs of LBO  - DVT ppx  - Dispo: Floor, OR Monday

## 2018-05-06 NOTE — CONSULT NOTE ADULT - SUBJECTIVE AND OBJECTIVE BOX
HPI:  62 y.o. female PMH HTN, neuropathy, Morbid Obesity, s/p vertical incision C-sections x 4 p/w abdominal pain a/w incarcerated ventral incisional hernia s/p reduction.  Patient is scheduled for ventral hernia repair Monday, May 7.    Patient says she is greatly deconditioned and gets short of breath while walking up one flight of stairs.  She was told by her PCP that she has an arrhythmia and a heart murmur, not otherwise specified.  She says she was getting chest pains a year ago and got a stress test and an Echocardiogram, was told they were unconcerning but she did not know details.  In [place] we have a Cardiac Cath from 2013 showing clean coronaries.    No personal or family history of bleeding disorder or bad reaction to anesthesia.    PAST MEDICAL & SURGICAL HISTORY:  Subdural Hematoma ()  Thalassemia minor  Carpal Tunnel Syndrome  Neuropathy of foot  Obesity  HTN (hypertension)  History of  section x 4      FAMILY HISTORY:  Mother - Breast Cancer, Heart Disease NOS      SOCIAL HISTORY:  Smoking:  Never  Occupation:  8th grade       Allergies    latex (Rash; Hives)  Percocet 10/325 (Hives)    Intolerances        HOME MEDICATIONS:    REVIEW OF SYSTEMS:  Constitutional: No fevers or chills. No weight loss. No fatigue or generalized malaise.  Eyes: No itching or discharge from the eyes  ENT: No ear pain. No ear discharge. No nasal congestion. No post nasal drip. No epistaxis. No throat pain. No sore throat. No difficulty swallowing.   CV: No chest pain. No palpitations. No lightheadedness or dizziness.   Resp: No dyspnea at rest.   Does endorse dyspnea on exertion. No orthopnea. No wheezing. No cough  GI: No nausea. No vomiting. No diarrhea.  Abdominal pain greatly improved after hernia reduction, but .  MSK: No joint pain or pain in any extremities  Integumentary: No skin lesions. No pedal edema.  Neurological: No gross motor weakness.  Chronic stocking-glove parasthesias.      OBJECTIVE:  ICU Vital Signs Last 24 Hrs  T(C): 36.7 (06 May 2018 09:30), Max: 37.4 (05 May 2018 22:04)  T(F): 98.1 (06 May 2018 09:30), Max: 99.4 (05 May 2018 22:04)  HR: 89 (06 May 2018 09:30) (74 - 89)  BP: 151/86 (06 May 2018 09:30) (113/72 - 151/86)  BP(mean): --  ABP: --  ABP(mean): --  RR: 18 (06 May 2018 09:30) (17 - 18)  SpO2: 96% (06 May 2018 09:30) (96% - 97%)        05-05 @ 07:01  -  05- @ 07:00  --------------------------------------------------------  IN: 780 mL / OUT: 2250 mL / NET: -1470 mL      CAPILLARY BLOOD GLUCOSE          PHYSICAL EXAM:  General: Awake, alert, oriented X 3.   Morbidly Obese  HEENT:   Malampatti Grade II.  No nasal congestion.  No tonsillar or pharyngeal exudates.  MMM.  Neck:  Unable to appreciate JVD given body habitus  Respiratory: Normal chest expansion.  Normal and equal air entry.  No wheeze, rhonchi or rales.  Cardiovascular: S1 S2 normal. No murmurs, rubs or gallops.   Abdomen:  Obese, midline vertical laparotomy scar inferior to umbilicus.  Soft, moderate periumbilical tenderness without guarding or rebound.  tender, non-distended. Bowels Sounds normoactive.    Extremities: Warm to touch.  No pedal edema.   Skin: No rashes or skin lesions  Neurological:  Nonfocal  Psychiatry: Appropriate mood and affect.    HOSPITAL MEDICATIONS:  MEDICATIONS  (STANDING):  amLODIPine   Tablet 5 milliGRAM(s) Oral <User Schedule>  dextrose 5% + sodium chloride 0.3%. 1000 milliLiter(s) (75 mL/Hr) IV Continuous <Continuous>  gabapentin 300 milliGRAM(s) Oral two times a day  heparin  Injectable 5000 Unit(s) SubCutaneous every 8 hours  hydrochlorothiazide 12.5 milliGRAM(s) Oral daily    MEDICATIONS  (PRN):      LABS:                        12.3   11.15 )-----------( 233      ( 06 May 2018 06:00 )             40.2     05-06    141  |  101  |  14  ----------------------------<  102<H>  3.8   |  28  |  0.79    Ca    8.8      06 May 2018 06:00  Phos  4.7     05-06  Mg     2.2     05-06    TPro  7.7  /  Alb  4.2  /  TBili  0.4  /  DBili  x   /  AST  41<H>  /  ALT  30  /  AlkPhos  93  05-04    PT/INR - ( 04 May 2018 20:00 )   PT: 11.6 SEC;   INR: 1.04          PTT - ( 04 May 2018 20:00 )  PTT:31.2 SEC  Urinalysis Basic - ( 04 May 2018 13:12 )    Color: YELLOW / Appearance: CLEAR / S.025 / pH: 6.5  Gluc: NEGATIVE / Ketone: NEGATIVE  / Bili: NEGATIVE / Urobili: NORMAL mg/dL   Blood: NEGATIVE / Protein: 20 mg/dL / Nitrite: NEGATIVE   Leuk Esterase: NEGATIVE / RBC: 0-2 / WBC 2-5   Sq Epi: OCC / Non Sq Epi: x / Bacteria: x        Venous Blood Gas:   @ 13:12  7.42/44/40/27/71.2  VBG Lactate: 1.1      RADIOLOGY:  [X] Reviewed and interpreted by me:  Chest Xray from today shows possible increased cephalad vascular markings in right lung field, normal heart size, sharp costophrenic angles.    EKG:  Normal sinus rhythm, left axis deviation.  No T-wave inversions, ST-elevations, or ST-depressions. HPI:  62 y.o. female PMH HTN, neuropathy, Morbid Obesity, s/p vertical incision C-sections x 4 p/w abdominal pain a/w incarcerated ventral incisional hernia s/p reduction.  Patient is scheduled for ventral hernia repair Monday, May 7.    Patient says she is greatly deconditioned and gets short of breath while walking up one flight of stairs.  She was told by her PCP that she has an arrhythmia and a heart murmur, not otherwise specified.  She says she was getting chest pains a year ago and got a stress test and an Echocardiogram, was told they were unconcerning but she did not know details.  In [place] we have a Cardiac Cath from 2013 showing clean coronaries.    No personal or family history of bleeding disorder or bad reaction to anesthesia.    PAST MEDICAL & SURGICAL HISTORY:  Subdural Hematoma ()  Thalassemia minor  Carpal Tunnel Syndrome  Neuropathy of foot  Obesity  HTN (hypertension)  History of  section x 4      FAMILY HISTORY:  Mother - Breast Cancer, Heart Disease NOS      SOCIAL HISTORY:  Smoking:  Never  Occupation:  8th grade       Allergies    latex (Rash; Hives)  Percocet 10/325 (Hives)    Intolerances        HOME MEDICATIONS:  Amlodipine  Gabapentin      REVIEW OF SYSTEMS:  Constitutional: No fevers or chills. No weight loss. No fatigue or generalized malaise.  Eyes: No itching or discharge from the eyes  ENT: No ear pain. No ear discharge. No nasal congestion. No post nasal drip. No epistaxis. No throat pain. No sore throat. No difficulty swallowing.   CV: No chest pain. No palpitations. No lightheadedness or dizziness.   Resp: No dyspnea at rest.   Does endorse dyspnea on exertion. No orthopnea. No wheezing. No cough  GI: No nausea. No vomiting. No diarrhea.  Abdominal pain greatly improved after hernia reduction, but .  MSK: No joint pain or pain in any extremities  Integumentary: No skin lesions. No pedal edema.  Neurological: No gross motor weakness.  Chronic stocking-glove parasthesias.      OBJECTIVE:  ICU Vital Signs Last 24 Hrs  T(C): 36.7 (06 May 2018 09:30), Max: 37.4 (05 May 2018 22:04)  T(F): 98.1 (06 May 2018 09:30), Max: 99.4 (05 May 2018 22:04)  HR: 89 (06 May 2018 09:30) (74 - 89)  BP: 151/86 (06 May 2018 09:30) (113/72 - 151/86)  BP(mean): --  ABP: --  ABP(mean): --  RR: 18 (06 May 2018 09:30) (17 - 18)  SpO2: 96% (06 May 2018 09:30) (96% - 97%)        05-05 @ 07:01  -  05- @ 07:00  --------------------------------------------------------  IN: 780 mL / OUT: 2250 mL / NET: -1470 mL      CAPILLARY BLOOD GLUCOSE          PHYSICAL EXAM:  General: Awake, alert, oriented X 3.   Morbidly Obese  HEENT:   Malampatti Grade II.  No nasal congestion.  No tonsillar or pharyngeal exudates.  MMM.  Neck:  Unable to appreciate JVD given body habitus  Respiratory: Normal chest expansion.  Normal and equal air entry.  No wheeze, rhonchi or rales.  Cardiovascular: S1 S2 normal. No murmurs, rubs or gallops.   Abdomen:  Obese, midline vertical laparotomy scar inferior to umbilicus.  Soft, moderate periumbilical tenderness without guarding or rebound.  tender, non-distended. Bowels Sounds normoactive.    Extremities: Warm to touch.  No pedal edema.   Skin: No rashes or skin lesions  Neurological:  Nonfocal  Psychiatry: Appropriate mood and affect.    HOSPITAL MEDICATIONS:  MEDICATIONS  (STANDING):  amLODIPine   Tablet 5 milliGRAM(s) Oral <User Schedule>  dextrose 5% + sodium chloride 0.3%. 1000 milliLiter(s) (75 mL/Hr) IV Continuous <Continuous>  gabapentin 300 milliGRAM(s) Oral two times a day  heparin  Injectable 5000 Unit(s) SubCutaneous every 8 hours  hydrochlorothiazide 12.5 milliGRAM(s) Oral daily    MEDICATIONS  (PRN):      LABS:                        12.3   11.15 )-----------( 233      ( 06 May 2018 06:00 )             40.2     05-06    141  |  101  |  14  ----------------------------<  102<H>  3.8   |  28  |  0.79    Ca    8.8      06 May 2018 06:00  Phos  4.7     05-06  Mg     2.2     05-06    TPro  7.7  /  Alb  4.2  /  TBili  0.4  /  DBili  x   /  AST  41<H>  /  ALT  30  /  AlkPhos  93  05-04    PT/INR - ( 04 May 2018 20:00 )   PT: 11.6 SEC;   INR: 1.04          PTT - ( 04 May 2018 20:00 )  PTT:31.2 SEC  Urinalysis Basic - ( 04 May 2018 13:12 )    Color: YELLOW / Appearance: CLEAR / S.025 / pH: 6.5  Gluc: NEGATIVE / Ketone: NEGATIVE  / Bili: NEGATIVE / Urobili: NORMAL mg/dL   Blood: NEGATIVE / Protein: 20 mg/dL / Nitrite: NEGATIVE   Leuk Esterase: NEGATIVE / RBC: 0-2 / WBC 2-5   Sq Epi: OCC / Non Sq Epi: x / Bacteria: x        Venous Blood Gas:   @ 13:12  7.42/44/40/27/71.2  VBG Lactate: 1.1      RADIOLOGY:  [X] Reviewed and interpreted by me:  Chest Xray from today shows possible increased cephalad vascular markings in right lung field, normal heart size, sharp costophrenic angles.    EKG:  Normal sinus rhythm, left axis deviation.  No T-wave inversions, ST-elevations, or ST-depressions.

## 2018-05-07 ENCOUNTER — RESULT REVIEW (OUTPATIENT)
Age: 63
End: 2018-05-07

## 2018-05-07 ENCOUNTER — TRANSCRIPTION ENCOUNTER (OUTPATIENT)
Age: 63
End: 2018-05-07

## 2018-05-07 LAB
BUN SERPL-MCNC: 15 MG/DL — SIGNIFICANT CHANGE UP (ref 7–23)
CALCIUM SERPL-MCNC: 8.8 MG/DL — SIGNIFICANT CHANGE UP (ref 8.4–10.5)
CHLORIDE SERPL-SCNC: 101 MMOL/L — SIGNIFICANT CHANGE UP (ref 98–107)
CO2 SERPL-SCNC: 26 MMOL/L — SIGNIFICANT CHANGE UP (ref 22–31)
CREAT SERPL-MCNC: 0.79 MG/DL — SIGNIFICANT CHANGE UP (ref 0.5–1.3)
GLUCOSE SERPL-MCNC: 108 MG/DL — HIGH (ref 70–99)
HCG SERPL-ACNC: < 5 MIU/ML — SIGNIFICANT CHANGE UP
HCT VFR BLD CALC: 41.5 % — SIGNIFICANT CHANGE UP (ref 34.5–45)
HGB BLD-MCNC: 12.7 G/DL — SIGNIFICANT CHANGE UP (ref 11.5–15.5)
INR BLD: 0.88 — SIGNIFICANT CHANGE UP (ref 0.88–1.17)
MAGNESIUM SERPL-MCNC: 2.2 MG/DL — SIGNIFICANT CHANGE UP (ref 1.6–2.6)
MCHC RBC-ENTMCNC: 21.9 PG — LOW (ref 27–34)
MCHC RBC-ENTMCNC: 30.6 % — LOW (ref 32–36)
MCV RBC AUTO: 71.7 FL — LOW (ref 80–100)
NRBC # FLD: 0 — SIGNIFICANT CHANGE UP
PHOSPHATE SERPL-MCNC: 4.4 MG/DL — SIGNIFICANT CHANGE UP (ref 2.5–4.5)
PLATELET # BLD AUTO: 224 K/UL — SIGNIFICANT CHANGE UP (ref 150–400)
PMV BLD: 11 FL — SIGNIFICANT CHANGE UP (ref 7–13)
POTASSIUM SERPL-MCNC: 4.6 MMOL/L — SIGNIFICANT CHANGE UP (ref 3.5–5.3)
POTASSIUM SERPL-SCNC: 4.6 MMOL/L — SIGNIFICANT CHANGE UP (ref 3.5–5.3)
PROTHROM AB SERPL-ACNC: 10.1 SEC — SIGNIFICANT CHANGE UP (ref 9.8–13.1)
RBC # BLD: 5.79 M/UL — HIGH (ref 3.8–5.2)
RBC # FLD: 18.5 % — HIGH (ref 10.3–14.5)
SODIUM SERPL-SCNC: 141 MMOL/L — SIGNIFICANT CHANGE UP (ref 135–145)
WBC # BLD: 10.42 K/UL — SIGNIFICANT CHANGE UP (ref 3.8–10.5)
WBC # FLD AUTO: 10.42 K/UL — SIGNIFICANT CHANGE UP (ref 3.8–10.5)

## 2018-05-07 PROCEDURE — 99222 1ST HOSP IP/OBS MODERATE 55: CPT

## 2018-05-07 PROCEDURE — 88302 TISSUE EXAM BY PATHOLOGIST: CPT | Mod: 26

## 2018-05-07 RX ORDER — DEXAMETHASONE 0.5 MG/5ML
4 ELIXIR ORAL EVERY 6 HOURS
Qty: 0 | Refills: 0 | Status: DISCONTINUED | OUTPATIENT
Start: 2018-05-07 | End: 2018-05-09

## 2018-05-07 RX ORDER — ACETAMINOPHEN 500 MG
1000 TABLET ORAL ONCE
Qty: 0 | Refills: 0 | Status: COMPLETED | OUTPATIENT
Start: 2018-05-08 | End: 2018-05-08

## 2018-05-07 RX ORDER — ONDANSETRON 8 MG/1
4 TABLET, FILM COATED ORAL ONCE
Qty: 0 | Refills: 0 | Status: DISCONTINUED | OUTPATIENT
Start: 2018-05-07 | End: 2018-05-07

## 2018-05-07 RX ORDER — HYDROMORPHONE HYDROCHLORIDE 2 MG/ML
30 INJECTION INTRAMUSCULAR; INTRAVENOUS; SUBCUTANEOUS
Qty: 0 | Refills: 0 | Status: DISCONTINUED | OUTPATIENT
Start: 2018-05-07 | End: 2018-05-09

## 2018-05-07 RX ORDER — NALOXONE HYDROCHLORIDE 4 MG/.1ML
0.1 SPRAY NASAL
Qty: 0 | Refills: 0 | Status: DISCONTINUED | OUTPATIENT
Start: 2018-05-07 | End: 2018-05-09

## 2018-05-07 RX ORDER — HYDROMORPHONE HYDROCHLORIDE 2 MG/ML
0.5 INJECTION INTRAMUSCULAR; INTRAVENOUS; SUBCUTANEOUS
Qty: 0 | Refills: 0 | Status: DISCONTINUED | OUTPATIENT
Start: 2018-05-07 | End: 2018-05-09

## 2018-05-07 RX ORDER — DIPHENHYDRAMINE HCL 50 MG
12.5 CAPSULE ORAL EVERY 4 HOURS
Qty: 0 | Refills: 0 | Status: DISCONTINUED | OUTPATIENT
Start: 2018-05-07 | End: 2018-05-10

## 2018-05-07 RX ORDER — HYDROMORPHONE HYDROCHLORIDE 2 MG/ML
0.5 INJECTION INTRAMUSCULAR; INTRAVENOUS; SUBCUTANEOUS
Qty: 0 | Refills: 0 | Status: DISCONTINUED | OUTPATIENT
Start: 2018-05-07 | End: 2018-05-07

## 2018-05-07 RX ORDER — PROCHLORPERAZINE MALEATE 5 MG
10 TABLET ORAL EVERY 6 HOURS
Qty: 0 | Refills: 0 | Status: DISCONTINUED | OUTPATIENT
Start: 2018-05-07 | End: 2018-05-09

## 2018-05-07 RX ORDER — AMLODIPINE BESYLATE 2.5 MG/1
5 TABLET ORAL DAILY
Qty: 0 | Refills: 0 | Status: DISCONTINUED | OUTPATIENT
Start: 2018-05-07 | End: 2018-05-10

## 2018-05-07 RX ORDER — ENOXAPARIN SODIUM 100 MG/ML
40 INJECTION SUBCUTANEOUS DAILY
Qty: 0 | Refills: 0 | Status: DISCONTINUED | OUTPATIENT
Start: 2018-05-07 | End: 2018-05-10

## 2018-05-07 RX ORDER — ONDANSETRON 8 MG/1
4 TABLET, FILM COATED ORAL EVERY 6 HOURS
Qty: 0 | Refills: 0 | Status: DISCONTINUED | OUTPATIENT
Start: 2018-05-07 | End: 2018-05-10

## 2018-05-07 RX ORDER — SODIUM CHLORIDE 9 MG/ML
1000 INJECTION, SOLUTION INTRAVENOUS
Qty: 0 | Refills: 0 | Status: DISCONTINUED | OUTPATIENT
Start: 2018-05-07 | End: 2018-05-08

## 2018-05-07 RX ORDER — MEPERIDINE HYDROCHLORIDE 50 MG/ML
12.5 INJECTION INTRAMUSCULAR; INTRAVENOUS; SUBCUTANEOUS
Qty: 0 | Refills: 0 | Status: DISCONTINUED | OUTPATIENT
Start: 2018-05-07 | End: 2018-05-07

## 2018-05-07 RX ORDER — ACETAMINOPHEN 500 MG
1000 TABLET ORAL ONCE
Qty: 0 | Refills: 0 | Status: COMPLETED | OUTPATIENT
Start: 2018-05-07 | End: 2018-05-08

## 2018-05-07 RX ORDER — LOSARTAN POTASSIUM 100 MG/1
50 TABLET, FILM COATED ORAL DAILY
Qty: 0 | Refills: 0 | Status: DISCONTINUED | OUTPATIENT
Start: 2018-05-07 | End: 2018-05-10

## 2018-05-07 RX ORDER — HYDROCHLOROTHIAZIDE 25 MG
12.5 TABLET ORAL DAILY
Qty: 0 | Refills: 0 | Status: DISCONTINUED | OUTPATIENT
Start: 2018-05-07 | End: 2018-05-10

## 2018-05-07 RX ADMIN — HYDROMORPHONE HYDROCHLORIDE 0.5 MILLIGRAM(S): 2 INJECTION INTRAMUSCULAR; INTRAVENOUS; SUBCUTANEOUS at 14:54

## 2018-05-07 RX ADMIN — SODIUM CHLORIDE 75 MILLILITER(S): 9 INJECTION, SOLUTION INTRAVENOUS at 14:45

## 2018-05-07 RX ADMIN — HYDROMORPHONE HYDROCHLORIDE 0.5 MILLIGRAM(S): 2 INJECTION INTRAMUSCULAR; INTRAVENOUS; SUBCUTANEOUS at 15:15

## 2018-05-07 RX ADMIN — HYDROMORPHONE HYDROCHLORIDE 30 MILLILITER(S): 2 INJECTION INTRAMUSCULAR; INTRAVENOUS; SUBCUTANEOUS at 20:41

## 2018-05-07 RX ADMIN — HYDROMORPHONE HYDROCHLORIDE 30 MILLILITER(S): 2 INJECTION INTRAMUSCULAR; INTRAVENOUS; SUBCUTANEOUS at 17:08

## 2018-05-07 RX ADMIN — HYDROMORPHONE HYDROCHLORIDE 30 MILLILITER(S): 2 INJECTION INTRAMUSCULAR; INTRAVENOUS; SUBCUTANEOUS at 15:18

## 2018-05-07 NOTE — DISCHARGE NOTE ADULT - DEVELOP COPING SKILLS. FOR EXAMPLE, STRATEGIES AND LIFESTYLE CHANGES THAT REDUCE NEGATIVE MOODS, STRESS, AND EXPOSURE TO SMOKING CUES
Camelia,  Your left foot xray showed moderate arthritis of the left big toe but no concerns over the painful part of the foot.  Let us continue to consult podiatry as planned.   Let me know if you have any questions. Take care.  Marta Calvin MD   Statement Selected

## 2018-05-07 NOTE — DISCHARGE NOTE ADULT - PLAN OF CARE
s/p hernia repair 5/7 WOUND CARE:  Please keep incisions clean and dry. Please do not Scrub or rub incisions. Do not use lotion or powder on incisions.   BATHING: You may shower and/or sponge bathe. You may use warm soapy water in the shower and rinse, pat dry.  ACTIVITY: No heavy lifting or straining. Otherwise, you may return to your usual level of physical activity. If you are taking narcotic pain medication DO NOT drive a car, operate machinery or make important decisions.  DIET: Return to your usual diet.  NOTIFY YOUR SURGEON IF: You have any bleeding that does not stop, any pus draining from your wound(s), any fever (over 100.4 F) persistent nausea/vomiting, or if your pain is not controlled on your discharge pain medications, unable to urinate.  Please follow up with your primary care physician in one week regarding your hospitalization, bring copies of your discharge paperwork.  Please follow up with your surgeon, Dr. Manuel WOUND CARE:  Please keep incisions clean and dry. Please do not Scrub or rub incisions. Do not use lotion or powder on incisions.   BATHING: You may shower and/or sponge bathe. You may use warm soapy water in the shower and rinse, pat dry.  ACTIVITY: No heavy lifting or straining. Otherwise, you may return to your usual level of physical activity. If you are taking narcotic pain medication DO NOT drive a car, operate machinery or make important decisions.  DIET: Return to your usual diet.  NOTIFY YOUR SURGEON IF: You have any bleeding that does not stop, any pus draining from your wound(s), any fever (over 100.4 F) persistent nausea/vomiting, or if your pain is not controlled on your discharge pain medications, unable to urinate.  Please follow up with your primary care physician in one week regarding your hospitalization, bring copies of your discharge paperwork.  Please follow up with your surgeon, Dr. Manuel in one week.  Call to schedule an appointment.

## 2018-05-07 NOTE — PROGRESS NOTE ADULT - ASSESSMENT
63 yo female s/p 4 c-sections with incarcerated ventral hernia, s/p bedside reduction, going to the OR today for hernia repair. Patient seen by cardiology yesterday, No further cardiac evaluation is required prior to procedure. TTE in 2015 revealed EF of 50%    - NPO, MIVF  - Continue BP medications  - Consent in chart  - DVT ppx  - Dispo: Floor, OR on add on schedule   - D/w A team

## 2018-05-07 NOTE — DISCHARGE NOTE ADULT - PATIENT PORTAL LINK FT
You can access the [facility], offered by [facility], by registering with the following website: [email or web address]

## 2018-05-07 NOTE — DISCHARGE NOTE ADULT - MEDICATION SUMMARY - MEDICATIONS TO CHANGE
[facility]  Pulmonary, Critical Care, and Sleep Medicine    Name: Riki Figueroa MRN: [id number]   : 1942 Hospital: [facility] [place]   Date: 3/11/2017  Room: [facility]     Subjective:   3/11  Issues with sleeping at night with anxiety. C/o left hip pain. Tolerating the bipap. She wants sosa cather. I advised against it due to infection risk. Riki Figueroa is a 69 y.o. female with IMANI/OHS, pulmonary HTN, and COPD who came in after being unable to get her BiPAP at the SNF and had increasing leg edema. She had some respiratory distress and was therefore brought to the ED. She was placed on BiPAP when she came into the ED for the night and did well and was able to be transitioned to HFNC. She has been receiving diuresis and has since been transferred to the floor as she no longer required ICU level of care.        Past Medical History:   Diagnosis Date    A-fib [facility])     Arthritis     Back injury     [facility] pulmonary disease [facility]     Diabetes ([facility]     Fibromyalgia     Heart failure ([facility]     Hypertension     MRSA (methicillin resistant Staphylococcus aureus)     Obesity hypoventilation syndrome ([facility] [facility] 2017    Pulmonary hypertension, moderate to severe (HCC) 2017       Current Facility-Administered Medications   Medication Dose Route Frequency    furosemide (LASIX) injection 40 mg  40 mg IntraVENous Q12H    potassium chloride (K-DUR, KLOR-CON) SR tablet 20 mEq  20 mEq Oral DAILY    lidocaine (LIDODERM) 5 % patch 1 Patch  1 Patch TransDERmal Q24H    enoxaparin (LOVENOX) injection 110 mg  110 mg SubCUTAneous Q12H    fluconazole (DIFLUCAN) tablet 100 mg  100 mg Oral DAILY    insulin lispro (HUMALOG) injection 5 Units  5 Units SubCUTAneous TIDAC    HYDROcodone-acetaminophen (NORCO) 5-325 mg per tablet 1 Tab  1 Tab Oral Q6H PRN    insulin glargine (LANTUS) injection 20 Units  20 Units SubCUTAneous QHS    digoxin (LANOXIN) tablet 0.125 mg 0.125 mg Oral DAILY    carvedilol (COREG) tablet 25 mg  25 mg Oral BID WITH MEALS    dilTIAZem (CARDIZEM) IR tablet 30 mg  30 mg Oral TIDAC    ELECTROLYTE REPLACEMENT PROTOCOL  1 Each Other PRN    ELECTROLYTE REPLACEMENT PROTOCOL  1 Each Other PRN    pantoprazole (PROTONIX) tablet 40 mg  40 mg Oral ACB    polyvinyl alcohol (LIQUIFILM TEARS) 1.4 % ophthalmic solution 1 Drop  1 Drop Both Eyes PRN    nystatin (MYCOSTATIN) 100,000 unit/gram powder   Topical BID    docusate sodium (COLACE) capsule 100 mg  100 mg Oral BID    simvastatin (ZOCOR) tablet 10 mg  10 mg Oral QHS    aspirin chewable tablet 81 mg  81 mg Oral DAILY    arformoterol (BROVANA) neb solution 15 mcg  15 mcg Nebulization BID RT    budesonide (PULMICORT) 500 mcg/2 ml nebulizer suspension  500 mcg Nebulization BID RT    albuterol-ipratropium (DUO-NEB) 2.5 MG-0.5 MG/3 ML  3 mL Nebulization Q4H PRN    ondansetron (ZOFRAN) injection 4 mg  4 mg IntraVENous Q6H PRN    acetaminophen (TYLENOL) tablet 650 mg  650 mg Oral Q4H PRN    insulin lispro (HUMALOG) injection   SubCUTAneous AC&HS    glucose chewable tablet 16 g  4 Tab Oral PRN    glucagon (GLUCAGEN) injection 1 mg  1 mg IntraMUSCular PRN    dextrose (D50W) injection syrg 12.5-25 g  25-50 mL IntraVENous PRN    warfarin pharmacy to dose   Other PRN       Allergies   Allergen Reactions    Latex Hives    Adhesive Tape-Silicones Unknown (comments)    Bees [Sting, Bee] Unknown (comments)    Garlic Nausea and Vomiting    Zoloft [Sertraline] Hives            Objective:   Vital Signs:    Visit Vitals    /51    Pulse 64    Temp 98.5 °F (36.9 °C)    Resp 17    Ht 5' 4.96\" (1.65 m)    Wt 109.3 kg (240 lb 15.4 oz)    SpO2 99%    Breastfeeding No    BMI 40.15 kg/m2       O2 Device: Hi flow nasal cannula   O2 Flow Rate (L/min): 40 l/min   Temp (24hrs), Av °F (36.7 °C), Min:96.9 °F (36.1 °C), Max:98.5 °F (36.9 °C)       Intake/Output:     Intake/Output Summary (Last 24 hours) at 03/11/17 1121  Last data filed at 03/11/17 0850   Gross per 24 hour   Intake              840 ml   Output             2150 ml   Net            -1310 ml           Physical Exam:   General: tolerating bipap. Alert and appropriate. Neck: Trachea midline, no significant jvd  CVS: Regular rate; no audible murmur  RS: Mod AE bilaterally, decreased sounds in bases but clear to auscultation. Abd: soft, non-tender  Neuro: awake, alert, follows basic commands  Extrm: 2+ lower extremity edema. No cyanosis. Skin: warm, dry    Data review:   Labs:  Recent Results (from the past 12 hour(s))   MAGNESIUM    Collection Time: 03/11/17  3:25 AM   Result Value Ref Range    Magnesium 2.1 1.8 - 2.4 mg/dL   PROTHROMBIN TIME + INR    Collection Time: 03/11/17  3:25 AM   Result Value Ref Range    Prothrombin time 19.9 (H) 11.5 - 15.2 sec    INR 1.8 (H) 0.8 - 1.2     METABOLIC PANEL, BASIC    Collection Time: 03/11/17  3:25 AM   Result Value Ref Range    Sodium 141 136 - 145 mmol/L    Potassium 4.4 3.5 - 5.5 mmol/L    Chloride 104 100 - 108 mmol/L    CO2 34 (H) 21 - 32 mmol/L    Anion gap 3 3.0 - 18 mmol/L    Glucose 105 (H) 74 - 99 mg/dL    BUN 28 (H) 7.0 - 18 MG/DL    Creatinine 1.18 0.6 - 1.3 MG/DL    BUN/Creatinine ratio 24 (H) 12 - 20      GFR est AA 55 (L) >60 ml/min/1.73m2    GFR est non-AA 45 (L) >60 ml/min/1.73m2    Calcium 8.3 (L) 8.5 - 10.1 MG/DL   GLUCOSE, POC    Collection Time: 03/11/17  6:27 AM   Result Value Ref Range    Glucose (POC) 110 70 - 110 mg/dL       ABG:    No results found for: PH, PHI, PCO2, PCO2I, PO2, PO2I, HCO3, HCO3I, FIO2, FIO2I      Imaging:  No new imaging 3/4    IMPRESSION:   · Acute hypercapneic respiratory failure. I think this time patient's bicarb dropped significantly compare to prior ABG likely cause of her symptoms As I suspect her baseline Pco2 is high 60s. · Acute metabolic acidosis without adequate resp compensation. Possibly due to recent diamox use.     resolving  · Moderate pulmonary HTN  · OHS,    · Obesity  ·        RECOMMENDATIONS:   · continue Brovana and Pulmicort; duoneb prn  · Continue IV lasix for now avoid diamox on this patient. Patient's baseline HCO3 should be closer to 40s  Patients baseline PCO2 should be closer to high 60s or low 70s.   · Transition to HFNC today and bipap at night  · Lidocaine patch to left hip I will SWITCH the dose or number of times a day I take the medications listed below when I get home from the hospital:  None

## 2018-05-07 NOTE — BRIEF OPERATIVE NOTE - PROCEDURE
<<-----Click on this checkbox to enter Procedure Ventral hernia repair with mesh  05/07/2018  exploratory laparotomy ventral hernia repair with biologic mesh  Active  [id number]

## 2018-05-07 NOTE — DISCHARGE NOTE ADULT - INSTRUCTIONS
Keep incision site intact dry and clean, any difficulty breathing, swallowing, nausea/ vomiting or fever call md,  monitor CAT drain out put ,F/u md orders and instructions.

## 2018-05-07 NOTE — PROGRESS NOTE ADULT - SUBJECTIVE AND OBJECTIVE BOX
Morning Surgical Progress Note    SUBJECTIVE: Patient seen and examined at bedside with surgical team, patient states her pain has greatly improved since her hernia was reduced. Patient anxious to know what time she is going to the OR today, explained by team she is an add on and will likely go in the early afternoon.     Vital Signs Last 24 Hrs  T(C): 36.8 (07 May 2018 02:10), Max: 37.4 (06 May 2018 21:53)  T(F): 98.3 (07 May 2018 02:10), Max: 99.4 (06 May 2018 21:53)  HR: 74 (07 May 2018 02:10) (74 - 89)  BP: 118/60 (07 May 2018 02:10) (118/60 - 151/86)  BP(mean): 6 (07 May 2018 02:10) (6 - 6)  RR: 18 (07 May 2018 02:10) (18 - 18)  SpO2: 98% (07 May 2018 02:10) (96% - 100%)I&O's Detail    05 May 2018 07:01  -  06 May 2018 07:00  --------------------------------------------------------  IN:    lactated ringers.: 300 mL    Oral Fluid: 480 mL  Total IN: 780 mL    OUT:    Voided: 2250 mL  Total OUT: 2250 mL    Total NET: -1470 mL      06 May 2018 07:01  -  07 May 2018 06:45  --------------------------------------------------------  IN:    Oral Fluid: 740 mL  Total IN: 740 mL    OUT:    Voided: 1600 mL  Total OUT: 1600 mL    Total NET: -860 mL      MEDICATIONS  (STANDING):  amLODIPine   Tablet 5 milliGRAM(s) Oral <User Schedule>  dextrose 5% + sodium chloride 0.3%. 1000 milliLiter(s) (75 mL/Hr) IV Continuous <Continuous>  gabapentin 300 milliGRAM(s) Oral two times a day  heparin  Injectable 5000 Unit(s) SubCutaneous every 8 hours  hydrochlorothiazide 12.5 milliGRAM(s) Oral daily    MEDICATIONS  (PRN):  acetaminophen   Tablet. 650 milliGRAM(s) Oral every 6 hours PRN Mild Pain (1 - 3)      Physical Exam  General: A&Ox3, NAD  Abdominal: obese, soft nontender    LABS:                        12.3   11.15 )-----------( 233      ( 06 May 2018 06:00 )             40.2     05-06    141  |  101  |  14  ----------------------------<  102<H>  3.8   |  28  |  0.79    Ca    8.8      06 May 2018 06:00  Phos  4.7     05-06  Mg     2.2     05-06

## 2018-05-07 NOTE — DISCHARGE NOTE ADULT - HOSPITAL COURSE
63 yo female presents to the ER on 5/4/18 with abdominal pain. She was found to have an incisional hernia on exam and had it reduced at bedside, she then went for a Cat scan revealing Persistent left lower anterior abdominal wall hernia containing a short segment of transverse colon with wall thickening and trace amount of fluid. Patient seen by cardiology on 5/6 for preop evaluation. Her TTE in 2015 revealed EF of 50%. On 5/7 she went to the OR and is s/p hernia repair. During hospital course patients diet was slowly advanced as tolerated.  At this time, pt is tolerating a regular diet, ambulating and voiding.  Pt has been deemed stable for discharge at this time. 61 yo female presents to the ER on 5/4/18 with abdominal pain. She was found to have an incisional hernia on exam and had it reduced at bedside, she then went for a Cat scan revealing Persistent left lower anterior abdominal wall hernia containing a short segment of transverse colon with wall thickening and trace amount of fluid. Patient seen by cardiology on 5/6 for preop evaluation. Her TTE in 2015 revealed EF of 50%. On 5/7 she went to the OR and is s/p hernia repair. Pt tolerated procedure well, without complication.  Post op pt transferred from PACU to surgical floor.  Diet was slowly restarted and advanced as tolerated.  Pain control was transitioned from IV to PO pain meds.  At this time, pt is tolerating a regular diet, ambulating and voiding, with pain well controlled on PO pain meds.   Pt provided CAT teaching for her drains and will be discharged with VNS.  Per team and attending, pt is hemodynamically stable for discharge to home with VNS to follow up as an outpatient.

## 2018-05-07 NOTE — DISCHARGE NOTE ADULT - MEDICATION SUMMARY - MEDICATIONS TO TAKE
I will START or STAY ON the medications listed below when I get home from the hospital:    Gael Walker  -- Disp#1  -- Indication: For Home assist    oxyCODONE 5 mg oral tablet  -- 1 tab(s) by mouth every 6 hours, As needed, Moderate Pain (4 - 6) MDD:4  -- Indication: For Pain control    acetaminophen 325 mg oral tablet  -- 2 tab(s) by mouth every 6 hours, As needed, Mild Pain  -- Indication: For Pain control    gabapentin 100 mg oral capsule  -- 1 cap(s) by mouth 3 times a day  -- Indication: For Neuropathy    hydrochlorothiazide-losartan 12.5 mg-50 mg oral tablet  -- 1 tab(s) by mouth once a day  -- Indication: For Diuretic    amLODIPine 5 mg oral tablet  -- 1 tab(s) by mouth 2 times a day  -- Indication: For Hypertension I will START or STAY ON the medications listed below when I get home from the hospital:    Gael Walker  -- Disp#1  -- Indication: For Home assist    oxyCODONE 5 mg oral tablet  -- 1 tab(s) by mouth every 6 hours, As needed, Moderate Pain (4 - 6) MDD:4  -- Indication: For Pain control    acetaminophen 325 mg oral tablet  -- 2 tab(s) by mouth every 6 hours, As needed, Mild Pain  -- Indication: For Pain control    gabapentin 100 mg oral capsule  -- 1 cap(s) by mouth 3 times a day  -- Indication: For Neuropathy    hydrochlorothiazide-losartan 12.5 mg-50 mg oral tablet  -- 1 tab(s) by mouth once a day  -- Indication: For Diuretic    amLODIPine 5 mg oral tablet  -- 1 tab(s) by mouth 2 times a day  -- Indication: For Hypertension    amoxicillin-clavulanate 875 mg-125 mg oral tablet  -- 1 tab(s) by mouth every 12 hours   -- Finish all this medication unless otherwise directed by prescriber.  Take with food or milk.    -- Indication: For Anti- infective

## 2018-05-07 NOTE — DISCHARGE NOTE ADULT - CARE PLAN
Principal Discharge DX:	Incarcerated hernia  Goal:	s/p hernia repair 5/7  Assessment and plan of treatment:	WOUND CARE:  Please keep incisions clean and dry. Please do not Scrub or rub incisions. Do not use lotion or powder on incisions.   BATHING: You may shower and/or sponge bathe. You may use warm soapy water in the shower and rinse, pat dry.  ACTIVITY: No heavy lifting or straining. Otherwise, you may return to your usual level of physical activity. If you are taking narcotic pain medication DO NOT drive a car, operate machinery or make important decisions.  DIET: Return to your usual diet.  NOTIFY YOUR SURGEON IF: You have any bleeding that does not stop, any pus draining from your wound(s), any fever (over 100.4 F) persistent nausea/vomiting, or if your pain is not controlled on your discharge pain medications, unable to urinate.  Please follow up with your primary care physician in one week regarding your hospitalization, bring copies of your discharge paperwork.  Please follow up with your surgeon, Dr. Manuel Principal Discharge DX:	Incarcerated hernia  Goal:	s/p hernia repair 5/7  Assessment and plan of treatment:	WOUND CARE:  Please keep incisions clean and dry. Please do not Scrub or rub incisions. Do not use lotion or powder on incisions.   BATHING: You may shower and/or sponge bathe. You may use warm soapy water in the shower and rinse, pat dry.  ACTIVITY: No heavy lifting or straining. Otherwise, you may return to your usual level of physical activity. If you are taking narcotic pain medication DO NOT drive a car, operate machinery or make important decisions.  DIET: Return to your usual diet.  NOTIFY YOUR SURGEON IF: You have any bleeding that does not stop, any pus draining from your wound(s), any fever (over 100.4 F) persistent nausea/vomiting, or if your pain is not controlled on your discharge pain medications, unable to urinate.  Please follow up with your primary care physician in one week regarding your hospitalization, bring copies of your discharge paperwork.  Please follow up with your surgeon, Dr. Manuel in one week.  Call to schedule an appointment.

## 2018-05-07 NOTE — BRIEF OPERATIVE NOTE - OPERATION/FINDINGS
4/13/2021    Yong Hamm        [facility] [address]            Dear Yong Hamm,      Our records indicate that you are due for an appointment for a Colonoscopy in May 2021, or shortly there after, with Blake Gastelum MD.
exploratory laparotomy ventral hernia repair with biologic mesh

## 2018-05-07 NOTE — DISCHARGE NOTE ADULT - CARE PROVIDER_API CALL
Valerie Manuel (MD), ColonRectal Surgery; Surgery  [address] [facility]  [address]  [place] [address]  Phone: [phone]  Fax: [phone]

## 2018-05-08 LAB
BUN SERPL-MCNC: 18 MG/DL — SIGNIFICANT CHANGE UP (ref 7–23)
CALCIUM SERPL-MCNC: 8.4 MG/DL — SIGNIFICANT CHANGE UP (ref 8.4–10.5)
CHLORIDE SERPL-SCNC: 99 MMOL/L — SIGNIFICANT CHANGE UP (ref 98–107)
CO2 SERPL-SCNC: 25 MMOL/L — SIGNIFICANT CHANGE UP (ref 22–31)
CREAT SERPL-MCNC: 0.84 MG/DL — SIGNIFICANT CHANGE UP (ref 0.5–1.3)
GLUCOSE SERPL-MCNC: 102 MG/DL — HIGH (ref 70–99)
HBA1C BLD-MCNC: 6.2 % — HIGH (ref 4–5.6)
HCT VFR BLD CALC: 37.7 % — SIGNIFICANT CHANGE UP (ref 34.5–45)
HGB BLD-MCNC: 11.4 G/DL — LOW (ref 11.5–15.5)
MAGNESIUM SERPL-MCNC: 1.9 MG/DL — SIGNIFICANT CHANGE UP (ref 1.6–2.6)
MCHC RBC-ENTMCNC: 21.6 PG — LOW (ref 27–34)
MCHC RBC-ENTMCNC: 30.2 % — LOW (ref 32–36)
MCV RBC AUTO: 71.3 FL — LOW (ref 80–100)
NRBC # FLD: 0 — SIGNIFICANT CHANGE UP
PHOSPHATE SERPL-MCNC: 4.3 MG/DL — SIGNIFICANT CHANGE UP (ref 2.5–4.5)
PLATELET # BLD AUTO: 238 K/UL — SIGNIFICANT CHANGE UP (ref 150–400)
PMV BLD: 11.3 FL — SIGNIFICANT CHANGE UP (ref 7–13)
POTASSIUM SERPL-MCNC: 5 MMOL/L — SIGNIFICANT CHANGE UP (ref 3.5–5.3)
POTASSIUM SERPL-SCNC: 5 MMOL/L — SIGNIFICANT CHANGE UP (ref 3.5–5.3)
RBC # BLD: 5.29 M/UL — HIGH (ref 3.8–5.2)
RBC # FLD: 17.8 % — HIGH (ref 10.3–14.5)
SODIUM SERPL-SCNC: 136 MMOL/L — SIGNIFICANT CHANGE UP (ref 135–145)
TSH SERPL-MCNC: 1.27 UIU/ML — SIGNIFICANT CHANGE UP (ref 0.27–4.2)
WBC # BLD: 16.72 K/UL — HIGH (ref 3.8–10.5)
WBC # FLD AUTO: 16.72 K/UL — HIGH (ref 3.8–10.5)

## 2018-05-08 PROCEDURE — 99232 SBSQ HOSP IP/OBS MODERATE 35: CPT

## 2018-05-08 RX ORDER — ACETAMINOPHEN 500 MG
1000 TABLET ORAL ONCE
Qty: 0 | Refills: 0 | Status: COMPLETED | OUTPATIENT
Start: 2018-05-09 | End: 2018-05-09

## 2018-05-08 RX ORDER — SODIUM CHLORIDE 9 MG/ML
500 INJECTION, SOLUTION INTRAVENOUS ONCE
Qty: 0 | Refills: 0 | Status: COMPLETED | OUTPATIENT
Start: 2018-05-08 | End: 2018-05-08

## 2018-05-08 RX ORDER — ACETAMINOPHEN 500 MG
1000 TABLET ORAL ONCE
Qty: 0 | Refills: 0 | Status: COMPLETED | OUTPATIENT
Start: 2018-05-08 | End: 2018-05-08

## 2018-05-08 RX ORDER — SODIUM CHLORIDE 9 MG/ML
1000 INJECTION INTRAMUSCULAR; INTRAVENOUS; SUBCUTANEOUS
Qty: 0 | Refills: 0 | Status: DISCONTINUED | OUTPATIENT
Start: 2018-05-08 | End: 2018-05-09

## 2018-05-08 RX ADMIN — Medication 1000 MILLIGRAM(S): at 19:02

## 2018-05-08 RX ADMIN — Medication 400 MILLIGRAM(S): at 13:20

## 2018-05-08 RX ADMIN — SODIUM CHLORIDE 1000 MILLILITER(S): 9 INJECTION, SOLUTION INTRAVENOUS at 03:24

## 2018-05-08 RX ADMIN — Medication 1000 MILLIGRAM(S): at 13:50

## 2018-05-08 RX ADMIN — SODIUM CHLORIDE 20 MILLILITER(S): 9 INJECTION INTRAMUSCULAR; INTRAVENOUS; SUBCUTANEOUS at 18:31

## 2018-05-08 RX ADMIN — Medication 400 MILLIGRAM(S): at 01:44

## 2018-05-08 RX ADMIN — ENOXAPARIN SODIUM 40 MILLIGRAM(S): 100 INJECTION SUBCUTANEOUS at 13:20

## 2018-05-08 RX ADMIN — HYDROMORPHONE HYDROCHLORIDE 30 MILLILITER(S): 2 INJECTION INTRAMUSCULAR; INTRAVENOUS; SUBCUTANEOUS at 08:18

## 2018-05-08 RX ADMIN — SODIUM CHLORIDE 20 MILLILITER(S): 9 INJECTION, SOLUTION INTRAVENOUS at 13:20

## 2018-05-08 RX ADMIN — Medication 400 MILLIGRAM(S): at 18:32

## 2018-05-08 RX ADMIN — HYDROMORPHONE HYDROCHLORIDE 30 MILLILITER(S): 2 INJECTION INTRAMUSCULAR; INTRAVENOUS; SUBCUTANEOUS at 20:17

## 2018-05-08 RX ADMIN — Medication 1000 MILLIGRAM(S): at 03:16

## 2018-05-08 NOTE — PROGRESS NOTE ADULT - SUBJECTIVE AND OBJECTIVE BOX
Anesthesia Pain Management Service    SUBJECTIVE: Patient is doing well with IV PCA and no significant problems reported.    Pain Scale Score	At rest: ___ 	With Activity: ___ 	[X ] Refer to charted pain scores    THERAPY:    [ ] IV PCA Morphine		[ ] 5 mg/mL	[ ] 1 mg/mL  [X ] IV PCA Hydromorphone	[ ] 5 mg/mL	[X ] 1 mg/mL  [ ] IV PCA Fentanyl		[ ] 50 micrograms/mL    Demand dose __0.2_ lockout __6_ (minutes) Continuous Rate _0__ Total: __3.5_  mg used (in past 24 hours)      MEDICATIONS  (STANDING):  amLODIPine   Tablet 5 milliGRAM(s) Oral daily  enoxaparin Injectable 40 milliGRAM(s) SubCutaneous daily  hydrochlorothiazide 12.5 milliGRAM(s) Oral daily  HYDROmorphone PCA (1 mG/mL) 30 milliLiter(s) PCA Continuous PCA Continuous  lactated ringers. 1000 milliLiter(s) (20 mL/Hr) IV Continuous <Continuous>  losartan 50 milliGRAM(s) Oral daily    MEDICATIONS  (PRN):  dexamethasone  Injectable 4 milliGRAM(s) IV Push every 6 hours PRN Nausea, IF ondansetron is ineffective after 30 - 60 minute  diphenhydrAMINE   Injectable 12.5 milliGRAM(s) IV Push every 4 hours PRN Pruritus  HYDROmorphone PCA (1 mG/mL) Rescue Clinician Bolus 0.5 milliGRAM(s) IV Push every 15 minutes PRN for Pain Scale GREATER THAN 6  naloxone Injectable 0.1 milliGRAM(s) IV Push every 3 minutes PRN For ANY of the following changes in patient status:  A. RR LESS THAN 10 breaths per minute, B. Oxygen saturation LESS THAN 90%, C. Sedation score of 6  ondansetron Injectable 4 milliGRAM(s) IV Push every 6 hours PRN Nausea  prochlorperazine   IVPB 10 milliGRAM(s) IV Intermittent every 6 hours PRN Nausea      OBJECTIVE:    Sedation Score:	[ X] Alert	[ ] Drowsy 	[ ] Arousable	[ ] Asleep	[ ] Unresponsive    Side Effects:	[X ] None	[ ] Nausea	[ ] Vomiting	[ ] Pruritus  		[ ] Other:    Vital Signs Last 24 Hrs  T(C): 36.8 (08 May 2018 14:07), Max: 36.8 (08 May 2018 09:49)  T(F): 98.2 (08 May 2018 14:07), Max: 98.2 (08 May 2018 09:49)  HR: 82 (08 May 2018 14:07) (60 - 82)  BP: 108/56 (08 May 2018 14:07) (96/58 - 118/72)  BP(mean): --  RR: 18 (08 May 2018 14:07) (15 - 23)  SpO2: 97% (08 May 2018 14:07) (94% - 100%)    ASSESSMENT/ PLAN    Therapy to  be:	[ X] Continue   [ ] Discontinued   [ ] Change to prn Analgesics    Documentation and Verification of current medications:   [X] Done	[ ] Not done, not elligible    Comments: continue current therapy with PCA    Progress Note written now but Patient was seen earlier.

## 2018-05-08 NOTE — PROGRESS NOTE ADULT - SUBJECTIVE AND OBJECTIVE BOX
ANESTHESIA POSTOP CHECK    62y Female POSTOP DAY 1     Vital Signs Last 24 Hrs  T(C): 36.8 (08 May 2018 14:07), Max: 36.8 (08 May 2018 09:49)  T(F): 98.2 (08 May 2018 14:07), Max: 98.2 (08 May 2018 09:49)  HR: 82 (08 May 2018 14:07) (60 - 82)  BP: 108/56 (08 May 2018 14:07) (96/58 - 118/72)  BP(mean): --  RR: 18 (08 May 2018 14:07) (15 - 23)  SpO2: 97% (08 May 2018 14:07) (94% - 100%)  I&O's Summary    07 May 2018 07:01  -  08 May 2018 07:00  --------------------------------------------------------  IN: 1750 mL / OUT: 860 mL / NET: 890 mL    08 May 2018 07:01  -  08 May 2018 14:48  --------------------------------------------------------  IN: 260 mL / OUT: 922.5 mL / NET: -662.5 mL        [X ] NO APPARENT ANESTHESIA COMPLICATIONS      Comments:  Note written now but patient seen earlier

## 2018-05-08 NOTE — PROGRESS NOTE ADULT - SUBJECTIVE AND OBJECTIVE BOX
Patient seen and examined at bedside.  Doing well w/ no new/acute complaints.     No post-op complications     Medications:  acetaminophen  IVPB. 1000 milliGRAM(s) IV Intermittent once  acetaminophen  IVPB. 1000 milliGRAM(s) IV Intermittent once  acetaminophen  IVPB. 1000 milliGRAM(s) IV Intermittent once  amLODIPine   Tablet 5 milliGRAM(s) Oral daily  dexamethasone  Injectable 4 milliGRAM(s) IV Push every 6 hours PRN  diphenhydrAMINE   Injectable 12.5 milliGRAM(s) IV Push every 4 hours PRN  enoxaparin Injectable 40 milliGRAM(s) SubCutaneous daily  hydrochlorothiazide 12.5 milliGRAM(s) Oral daily  HYDROmorphone PCA (1 mG/mL) 30 milliLiter(s) PCA Continuous PCA Continuous  HYDROmorphone PCA (1 mG/mL) Rescue Clinician Bolus 0.5 milliGRAM(s) IV Push every 15 minutes PRN  lactated ringers. 1000 milliLiter(s) IV Continuous <Continuous>  losartan 50 milliGRAM(s) Oral daily  naloxone Injectable 0.1 milliGRAM(s) IV Push every 3 minutes PRN  ondansetron Injectable 4 milliGRAM(s) IV Push every 6 hours PRN  prochlorperazine   IVPB 10 milliGRAM(s) IV Intermittent every 6 hours PRN      PAST MEDICAL & SURGICAL HISTORY:  CAD (coronary artery disease)  Neuropathy of foot  Obesity  HTN (hypertension)  History of  section    REVIEW OF SYSTEMS:    CONSTITUTIONAL: No weakness, fevers or chills  EYES/ENT: No visual changes;  No vertigo or throat pain   NECK: No pain or stiffness  RESPIRATORY: No cough, wheezing, hemoptysis; No shortness of breath  CARDIOVASCULAR: No chest pain or palpitations  GASTROINTESTINAL: +abdominal pain. No nausea, vomiting, or hematemesis; No diarrhea or constipation. No melena or hematochezia.  GENITOURINARY: No dysuria, frequency or hematuria  NEUROLOGICAL: No numbness or weakness  SKIN: No itching, rashes      Vitals:  T(F): 98.2 (-), Max: 98.2 (-)  HR: 78 (-) (60 - 79)  BP: 105/62 (-) (96/58 - 129/80)  RR: 18 (-)  SpO2: 100% (-)  I&O's Summary    07 May 2018 07:  -  08 May 2018 07:00  --------------------------------------------------------  IN: 1750 mL / OUT: 860 mL / NET: 890 mL    08 May 2018 07:  -  08 May 2018 11:54  --------------------------------------------------------  IN: 0 mL / OUT: 410 mL / NET: -410 mL        Physical Exam:  Appearance: No acute distress; well appearing  Eyes: PERRL, EOMI, pink conjunctiva  HENT: Normal oral mucosa  Cardiovascular: RRR, S1, S2, no murmurs, rubs, or gallops; no edema; no JVD  Respiratory: Clear to auscultation bilaterally  Gastrointestinal: soft, non-tender, non-distended with normal bowel sounds; surgical site in mid abd covered in binder/wraps; no purulunce or swelling   Musculoskeletal: No clubbing; no joint deformity   Neurologic: Non-focal  Lymphatic: No lymphadenopathy  Psychiatry: AAOx3, mood & affect appropriate  Skin: No rashes, ecchymoses, or cyanosis                          11.4   16.72 )-----------( 238      ( 08 May 2018 05:45 )             37.7     -08    136  |  99  |  18  ----------------------------<  102<H>  5.0   |  25  |  0.84    Ca    8.4      08 May 2018 05:45  Phos  4.3     -  Mg     1.9           PT/INR - ( 07 May 2018 06:00 )   PT: 10.1 SEC;   INR: 0.88       Hemoglobin A1C, Whole Blood: 6.2 % ( @ 05:45)    Assessment     62F w/ hx of palpitations, HTN, neuropathy, Morbid Obesity, s/p vertical incision multiple C. sections, w/ recent ventral hernia repair under general anesthesia.  Tolerated procedure well w/o complications.     Plan  - cont current meds   - post-op care as per surgery team  - will sign off  - please call [phone] w/ questions or concerns     [place] [phone]

## 2018-05-08 NOTE — PROGRESS NOTE ADULT - ASSESSMENT
A/P: 63 yo female s/p 4 c-sections with incarcerated ventral hernia, s/p bedside reduction. Now POD 1 s/p ventral hernia repair with biologic mesh.  Recovering well on the floor.    - Clear liquid diet, possible advance today  - IVF, possibly IVL today  - Await return of bowel function  - Continue BP medications  - PCA for pain control  - Encourage OOB  - DVT ppx: Lovenox A/P: 63 yo female s/p 4 c-sections with incarcerated ventral hernia, s/p partial bedside reduction. Now POD 1 s/p ventral hernia repair with biologic mesh.  Recovering well on the floor.    - Clear liquid diet, possible advance today  - IVF, possibly IVL today  - Await return of bowel function  - Continue BP medications  - PCA for pain control  - Encourage OOB  - DVT ppx: Lovenox

## 2018-05-08 NOTE — PROGRESS NOTE ADULT - SUBJECTIVE AND OBJECTIVE BOX
Surgery Progress Note    S:   No acute events overnight  Recovering well from surgery.  Pain is controlled w/ PCA.  No n/v.  Tolerating clears  Denies flatus or BM.    T(C): 36.7 (05-08-18 @ 05:53), Max: 37 (05-07-18 @ 09:31)  HR: 60 (05-08-18 @ 05:53) (60 - 86)  BP: 112/52 (05-08-18 @ 05:53) (96/58 - 145/97)  RR: 18 (05-08-18 @ 05:53) (11 - 23)  SpO2: 97% (05-08-18 @ 05:53) (94% - 100%)  Wt(kg): --    PHYSICAL EXAM:  General: NAD  Resp: nonlabored breathing  Abdominal: obese, soft, Mildly TTP, OR dressing clean/dry with abdominal binder in place, JPx2 serosanguinous       05-06 @ 07:01  -  05-07 @ 07:00  --------------------------------------------------------  IN:    Oral Fluid: 740 mL  Total IN: 740 mL    OUT:    Voided: 1600 mL  Total OUT: 1600 mL    Total NET: -860 mL      05-07 @ 07:01  -  05-08 @ 06:36  --------------------------------------------------------  IN:    IV PiggyBack: 100 mL    lactated ringers.: 900 mL  Total IN: 1000 mL    OUT:    Bulb: 55 mL    Bulb: 35 mL    Voided: 350 mL  Total OUT: 440 mL    Total NET: 560 mL                        12.7   10.42 )-----------( 224      ( 07 May 2018 06:00 )             41.5     CAPILLARY BLOOD GLUCOSE

## 2018-05-09 LAB
BUN SERPL-MCNC: 14 MG/DL — SIGNIFICANT CHANGE UP (ref 7–23)
CALCIUM SERPL-MCNC: 8.4 MG/DL — SIGNIFICANT CHANGE UP (ref 8.4–10.5)
CHLORIDE SERPL-SCNC: 102 MMOL/L — SIGNIFICANT CHANGE UP (ref 98–107)
CO2 SERPL-SCNC: 29 MMOL/L — SIGNIFICANT CHANGE UP (ref 22–31)
CREAT SERPL-MCNC: 0.8 MG/DL — SIGNIFICANT CHANGE UP (ref 0.5–1.3)
GLUCOSE SERPL-MCNC: 87 MG/DL — SIGNIFICANT CHANGE UP (ref 70–99)
HCT VFR BLD CALC: 35.4 % — SIGNIFICANT CHANGE UP (ref 34.5–45)
HGB BLD-MCNC: 11 G/DL — LOW (ref 11.5–15.5)
MAGNESIUM SERPL-MCNC: 2.2 MG/DL — SIGNIFICANT CHANGE UP (ref 1.6–2.6)
MCHC RBC-ENTMCNC: 22.4 PG — LOW (ref 27–34)
MCHC RBC-ENTMCNC: 31.1 % — LOW (ref 32–36)
MCV RBC AUTO: 72.2 FL — LOW (ref 80–100)
NRBC # FLD: 0 — SIGNIFICANT CHANGE UP
PHOSPHATE SERPL-MCNC: 3.3 MG/DL — SIGNIFICANT CHANGE UP (ref 2.5–4.5)
PLATELET # BLD AUTO: 223 K/UL — SIGNIFICANT CHANGE UP (ref 150–400)
PMV BLD: 11.4 FL — SIGNIFICANT CHANGE UP (ref 7–13)
POTASSIUM SERPL-MCNC: 4.7 MMOL/L — SIGNIFICANT CHANGE UP (ref 3.5–5.3)
POTASSIUM SERPL-SCNC: 4.7 MMOL/L — SIGNIFICANT CHANGE UP (ref 3.5–5.3)
RBC # BLD: 4.9 M/UL — SIGNIFICANT CHANGE UP (ref 3.8–5.2)
RBC # FLD: 17.8 % — HIGH (ref 10.3–14.5)
SODIUM SERPL-SCNC: 140 MMOL/L — SIGNIFICANT CHANGE UP (ref 135–145)
SURGICAL PATHOLOGY STUDY: SIGNIFICANT CHANGE UP
WBC # BLD: 13.61 K/UL — HIGH (ref 3.8–10.5)
WBC # FLD AUTO: 13.61 K/UL — HIGH (ref 3.8–10.5)

## 2018-05-09 RX ORDER — OXYCODONE HYDROCHLORIDE 5 MG/1
5 TABLET ORAL EVERY 6 HOURS
Qty: 0 | Refills: 0 | Status: DISCONTINUED | OUTPATIENT
Start: 2018-05-09 | End: 2018-05-10

## 2018-05-09 RX ORDER — ACETAMINOPHEN 500 MG
650 TABLET ORAL EVERY 6 HOURS
Qty: 0 | Refills: 0 | Status: DISCONTINUED | OUTPATIENT
Start: 2018-05-09 | End: 2018-05-10

## 2018-05-09 RX ORDER — HYDRALAZINE HCL 50 MG
5 TABLET ORAL ONCE
Qty: 0 | Refills: 0 | Status: COMPLETED | OUTPATIENT
Start: 2018-05-09 | End: 2018-05-09

## 2018-05-09 RX ORDER — OXYCODONE HYDROCHLORIDE 5 MG/1
10 TABLET ORAL EVERY 6 HOURS
Qty: 0 | Refills: 0 | Status: DISCONTINUED | OUTPATIENT
Start: 2018-05-09 | End: 2018-05-10

## 2018-05-09 RX ADMIN — OXYCODONE HYDROCHLORIDE 10 MILLIGRAM(S): 5 TABLET ORAL at 21:10

## 2018-05-09 RX ADMIN — ENOXAPARIN SODIUM 40 MILLIGRAM(S): 100 INJECTION SUBCUTANEOUS at 14:05

## 2018-05-09 RX ADMIN — OXYCODONE HYDROCHLORIDE 10 MILLIGRAM(S): 5 TABLET ORAL at 14:30

## 2018-05-09 RX ADMIN — OXYCODONE HYDROCHLORIDE 10 MILLIGRAM(S): 5 TABLET ORAL at 14:05

## 2018-05-09 RX ADMIN — Medication 400 MILLIGRAM(S): at 06:02

## 2018-05-09 RX ADMIN — Medication 12.5 MILLIGRAM(S): at 06:02

## 2018-05-09 RX ADMIN — LOSARTAN POTASSIUM 50 MILLIGRAM(S): 100 TABLET, FILM COATED ORAL at 06:02

## 2018-05-09 RX ADMIN — Medication 5 MILLIGRAM(S): at 18:40

## 2018-05-09 RX ADMIN — OXYCODONE HYDROCHLORIDE 10 MILLIGRAM(S): 5 TABLET ORAL at 21:40

## 2018-05-09 RX ADMIN — AMLODIPINE BESYLATE 5 MILLIGRAM(S): 2.5 TABLET ORAL at 06:02

## 2018-05-09 NOTE — PROGRESS NOTE ADULT - ASSESSMENT
62F with incarcerated ventral hernia s/p ventral hernia repair with mesh    - Cont regular diet  - Encourage OOB  - PT consult   - PO pain control  - Possibly d/c home tomorrow v. rehab

## 2018-05-09 NOTE — PROGRESS NOTE ADULT - ASSESSMENT
: 61 yo female s/p 4 c-sections with incarcerated ventral hernia, s/p partial bedside reduction. Now POD 2 s/p ventral hernia repair with biologic mesh.  Recovering well on the floor.    - Continue regular  - IVF @ 20 for PCA, D/c PCA today start sammi pain meds  - Continue BP medications  - Encourage OOB  - DVT ppx: Lovenox : 63 yo female s/p 4 c-sections with incarcerated ventral hernia, s/p partial bedside reduction. Now POD 2 s/p ventral hernia repair with biologic mesh.  Recovering well on the floor.    - Continue regular  - IVF @ 20 for PCA, D/c PCA today start oral pain meds  - Continue BP medications  - Encourage OOB  - DVT ppx: Lovenox : 63 yo female s/p 4 c-sections with incarcerated ventral hernia, s/p partial bedside reduction. Now POD 2 s/p ventral hernia repair with biologic mesh.  Recovering well on the floor.    - Continue regular  - IVF @ 20 for PCA, D/c PCA today start oral pain meds, patient states she cannot take percocet 2/2 itching but can take tylenol and oxycodone separately   - Continue BP medications  - Encourage OOB  - DVT ppx: Lovenox  - Set up VNS for home care

## 2018-05-09 NOTE — PROVIDER CONTACT NOTE (OTHER) - SITUATION
Patient has elevated /113 HR 84, patient resting comfortable no distress noted, MD made aware, meds given as ordered, will continue to monitor
Pt c.o chills, temperature of 100.5
Pt complaining of neuropathic pain on bilateral hands and feet. Pt requesting home dose of gabapentin 300mg.

## 2018-05-09 NOTE — PROGRESS NOTE ADULT - SUBJECTIVE AND OBJECTIVE BOX
Morning Surgical Progress Note    SUBJECTIVE: Patient seen and examined at bedside with surgical team, patient complains of pain with coughing, tolerating a regular diet. Patient states she does not think she is ready to go home today as she has no support at home.    Vital Signs Last 24 Hrs  T(C): 37 (09 May 2018 05:57), Max: 37 (08 May 2018 18:04)  T(F): 98.6 (09 May 2018 05:57), Max: 98.6 (08 May 2018 18:04)  HR: 78 (09 May 2018 05:57) (78 - 87)  BP: 133/78 (09 May 2018 05:57) (105/62 - 133/78)  BP(mean): --  RR: 17 (09 May 2018 05:57) (17 - 18)  SpO2: 100% (09 May 2018 05:57) (97% - 100%)I&O's Detail    07 May 2018 07:01  -  08 May 2018 07:00  --------------------------------------------------------  IN:    IV PiggyBack: 200 mL    Lactated Ringers IV Bolus: 500 mL    lactated ringers.: 1050 mL  Total IN: 1750 mL    OUT:    Bulb: 40 mL    Bulb: 70 mL    Voided: 750 mL  Total OUT: 860 mL    Total NET: 890 mL      08 May 2018 07:01  -  09 May 2018 06:26  --------------------------------------------------------  IN:    IV PiggyBack: 300 mL    lactated ringers.: 160 mL    Oral Fluid: 360 mL    sodium chloride 0.9%.: 320 mL  Total IN: 1140 mL    OUT:    Bulb: 40 mL    Bulb: 39 mL    Voided: 3625 mL  Total OUT: 3704 mL    Total NET: -2564 mL      MEDICATIONS  (STANDING):  amLODIPine   Tablet 5 milliGRAM(s) Oral daily  enoxaparin Injectable 40 milliGRAM(s) SubCutaneous daily  hydrochlorothiazide 12.5 milliGRAM(s) Oral daily  HYDROmorphone PCA (1 mG/mL) 30 milliLiter(s) PCA Continuous PCA Continuous  losartan 50 milliGRAM(s) Oral daily  sodium chloride 0.9%. 1000 milliLiter(s) (20 mL/Hr) IV Continuous <Continuous>    MEDICATIONS  (PRN):  dexamethasone  Injectable 4 milliGRAM(s) IV Push every 6 hours PRN Nausea, IF ondansetron is ineffective after 30 - 60 minute  diphenhydrAMINE   Injectable 12.5 milliGRAM(s) IV Push every 4 hours PRN Pruritus  HYDROmorphone PCA (1 mG/mL) Rescue Clinician Bolus 0.5 milliGRAM(s) IV Push every 15 minutes PRN for Pain Scale GREATER THAN 6  naloxone Injectable 0.1 milliGRAM(s) IV Push every 3 minutes PRN For ANY of the following changes in patient status:  A. RR LESS THAN 10 breaths per minute, B. Oxygen saturation LESS THAN 90%, C. Sedation score of 6  ondansetron Injectable 4 milliGRAM(s) IV Push every 6 hours PRN Nausea  prochlorperazine   IVPB 10 milliGRAM(s) IV Intermittent every 6 hours PRN Nausea      Physical Exam  General: A&Ox3, NAD  Abdominal: binder in place, incision c/d/i, kobe x 2 serosang    LABS:                        11.4   16.72 )-----------( 238      ( 08 May 2018 05:45 )             37.7     05-08    136  |  99  |  18  ----------------------------<  102<H>  5.0   |  25  |  0.84    Ca    8.4      08 May 2018 05:45  Phos  4.3     05-08  Mg     1.9     05-08

## 2018-05-09 NOTE — PROGRESS NOTE ADULT - SUBJECTIVE AND OBJECTIVE BOX
Anesthesia Pain Management Service    SUBJECTIVE: Patient is doing well with IV PCA and no significant problems reported.    Pain Scale Score	At rest: _2__ 	With Activity: ___ 	[X ] Refer to charted pain scores    THERAPY:    [ ] IV PCA Morphine		[ ] 5 mg/mL	[ ] 1 mg/mL  [X ] IV PCA Hydromorphone	[ ] 5 mg/mL	[X ] 1 mg/mL  [ ] IV PCA Fentanyl		[ ] 50 micrograms/mL    Demand dose __0.2_ lockout __6_ (minutes) Continuous Rate _0__ Total: _4.4__   mg used (in past 24 hrs)      MEDICATIONS  (STANDING):  amLODIPine   Tablet 5 milliGRAM(s) Oral daily  enoxaparin Injectable 40 milliGRAM(s) SubCutaneous daily  hydrochlorothiazide 12.5 milliGRAM(s) Oral daily  losartan 50 milliGRAM(s) Oral daily    MEDICATIONS  (PRN):  acetaminophen   Tablet. 650 milliGRAM(s) Oral every 6 hours PRN Mild Pain (1 - 3)  diphenhydrAMINE   Injectable 12.5 milliGRAM(s) IV Push every 4 hours PRN Pruritus  ondansetron Injectable 4 milliGRAM(s) IV Push every 6 hours PRN Nausea  oxyCODONE    IR 5 milliGRAM(s) Oral every 6 hours PRN Moderate Pain (4 - 6)  oxyCODONE    IR 10 milliGRAM(s) Oral every 6 hours PRN Severe Pain (7 - 10)      OBJECTIVE: laying in bed     Sedation Score:	[ X] Alert	[ ] Drowsy 	[ ] Arousable	[ ] Asleep	[ ] Unresponsive    Side Effects:	[X ] None	[ ] Nausea	[ ] Vomiting	[ ] Pruritus  		[ ] Other:    Vital Signs Last 24 Hrs  T(C): 36.8 (09 May 2018 10:19), Max: 37 (08 May 2018 18:04)  T(F): 98.3 (09 May 2018 10:19), Max: 98.6 (08 May 2018 18:04)  HR: 71 (09 May 2018 10:19) (71 - 87)  BP: 112/71 (09 May 2018 10:19) (108/56 - 133/78)  BP(mean): --  RR: 16 (09 May 2018 10:19) (16 - 18)  SpO2: 100% (09 May 2018 10:19) (97% - 100%)    ASSESSMENT/ PLAN    Therapy to  be:	[ ] Continue   [ X] Discontinued   [X ] Change to prn Analgesics    Documentation and Verification of current medications:   [X] Done	[ ] Not done, not elligible    Comments: PRN Oral/IV opioids and/or Adjuvant medication to be ordered at this point.

## 2018-05-09 NOTE — PROGRESS NOTE ADULT - SUBJECTIVE AND OBJECTIVE BOX
INTERVAL HPI/OVERNIGHT EVENTS: Pt seen and examined. Eating and tolerating diet. Pain improving. Pt worried about going home without assistance. Afebrile.    MEDICATIONS  (STANDING):  amLODIPine   Tablet 5 milliGRAM(s) Oral daily  enoxaparin Injectable 40 milliGRAM(s) SubCutaneous daily  hydrochlorothiazide 12.5 milliGRAM(s) Oral daily  losartan 50 milliGRAM(s) Oral daily    MEDICATIONS  (PRN):  acetaminophen   Tablet. 650 milliGRAM(s) Oral every 6 hours PRN Mild Pain (1 - 3)  diphenhydrAMINE   Injectable 12.5 milliGRAM(s) IV Push every 4 hours PRN Pruritus  ondansetron Injectable 4 milliGRAM(s) IV Push every 6 hours PRN Nausea  oxyCODONE    IR 5 milliGRAM(s) Oral every 6 hours PRN Moderate Pain (4 - 6)  oxyCODONE    IR 10 milliGRAM(s) Oral every 6 hours PRN Severe Pain (7 - 10)      Vital Signs Last 24 Hrs  T(C): 36.8 (09 May 2018 10:19), Max: 37 (08 May 2018 18:04)  T(F): 98.3 (09 May 2018 10:19), Max: 98.6 (08 May 2018 18:04)  HR: 71 (09 May 2018 10:19) (71 - 87)  BP: 112/71 (09 May 2018 10:19) (108/56 - 133/78)  BP(mean): --  RR: 16 (09 May 2018 10:19) (16 - 18)  SpO2: 100% (09 May 2018 10:19) (97% - 100%)    PHYSICAL EXAM:    Constitutional: NAD    Gastrointestinal: Soft, mildly tender, ND, both drains serosanguinous fluid, dressing C/D/I        I&O's Detail    08 May 2018 07:01  -  09 May 2018 07:00  --------------------------------------------------------  IN:    IV PiggyBack: 300 mL    lactated ringers.: 160 mL    Oral Fluid: 360 mL    sodium chloride 0.9%: 320 mL  Total IN: 1140 mL    OUT:    Bulb: 39 mL    Bulb: 40 mL    Voided: 3625 mL  Total OUT: 3704 mL    Total NET: -2564 mL      09 May 2018 07:01  -  09 May 2018 14:00  --------------------------------------------------------  IN:  Total IN: 0 mL    OUT:    Bulb: 5 mL    Bulb: 10 mL    Voided: 750 mL  Total OUT: 765 mL    Total NET: -765 mL          LABS:                        11.0   13.61 )-----------( 223      ( 09 May 2018 07:05 )             35.4     05-09    140  |  102  |  14  ----------------------------<  87  4.7   |  29  |  0.80    Ca    8.4      09 May 2018 07:05  Phos  3.3     05-09  Mg     2.2     05-09            RADIOLOGY & ADDITIONAL STUDIES:

## 2018-05-09 NOTE — PROGRESS NOTE ADULT - SUBJECTIVE AND OBJECTIVE BOX
Anesthesia Pain Management Service- Attending Addendum    SUBJECTIVE: Patient's pain control adequate    Therapy:	  [ X] IV PCA	   [ ] Epidural           [ ] s/p Spinal Opoid              [ ] Postpartum infusion	  [ ] Patient controlled regional anesthesia (PCRA)    [ ] prn Analgesics    Allergies    latex (Rash; Hives)  Percocet 10/325 (Hives)    Intolerances      MEDICATIONS  (STANDING):  amLODIPine   Tablet 5 milliGRAM(s) Oral daily  enoxaparin Injectable 40 milliGRAM(s) SubCutaneous daily  hydrALAZINE Injectable 5 milliGRAM(s) IV Push once  hydrochlorothiazide 12.5 milliGRAM(s) Oral daily  losartan 50 milliGRAM(s) Oral daily    MEDICATIONS  (PRN):  acetaminophen   Tablet. 650 milliGRAM(s) Oral every 6 hours PRN Mild Pain (1 - 3)  diphenhydrAMINE   Injectable 12.5 milliGRAM(s) IV Push every 4 hours PRN Pruritus  ondansetron Injectable 4 milliGRAM(s) IV Push every 6 hours PRN Nausea  oxyCODONE    IR 5 milliGRAM(s) Oral every 6 hours PRN Moderate Pain (4 - 6)  oxyCODONE    IR 10 milliGRAM(s) Oral every 6 hours PRN Severe Pain (7 - 10)      OBJECTIVE:   [X] No new signs     [ ] Other:    Side Effects:  [X ] None			[ ] Other:      ASSESSMENT/PLAN  -Discontinue current therapy    [ ] Therapy changed to:    [ ] IV PCA       [ ] Epidural     [ X] prn Analgesics     Comments: Pain management per primary team, APS to sign off

## 2018-05-10 VITALS
RESPIRATION RATE: 17 BRPM | HEART RATE: 80 BPM | TEMPERATURE: 98 F | OXYGEN SATURATION: 96 % | SYSTOLIC BLOOD PRESSURE: 147 MMHG | DIASTOLIC BLOOD PRESSURE: 82 MMHG

## 2018-05-10 LAB
BUN SERPL-MCNC: 12 MG/DL — SIGNIFICANT CHANGE UP (ref 7–23)
CALCIUM SERPL-MCNC: 9 MG/DL — SIGNIFICANT CHANGE UP (ref 8.4–10.5)
CHLORIDE SERPL-SCNC: 97 MMOL/L — LOW (ref 98–107)
CO2 SERPL-SCNC: 26 MMOL/L — SIGNIFICANT CHANGE UP (ref 22–31)
CREAT SERPL-MCNC: 0.78 MG/DL — SIGNIFICANT CHANGE UP (ref 0.5–1.3)
GLUCOSE SERPL-MCNC: 93 MG/DL — SIGNIFICANT CHANGE UP (ref 70–99)
HCT VFR BLD CALC: 40.1 % — SIGNIFICANT CHANGE UP (ref 34.5–45)
HGB BLD-MCNC: 12.4 G/DL — SIGNIFICANT CHANGE UP (ref 11.5–15.5)
MAGNESIUM SERPL-MCNC: 2.3 MG/DL — SIGNIFICANT CHANGE UP (ref 1.6–2.6)
MCHC RBC-ENTMCNC: 21.8 PG — LOW (ref 27–34)
MCHC RBC-ENTMCNC: 30.9 % — LOW (ref 32–36)
MCV RBC AUTO: 70.4 FL — LOW (ref 80–100)
NRBC # FLD: 0 — SIGNIFICANT CHANGE UP
PHOSPHATE SERPL-MCNC: 3.9 MG/DL — SIGNIFICANT CHANGE UP (ref 2.5–4.5)
PLATELET # BLD AUTO: 270 K/UL — SIGNIFICANT CHANGE UP (ref 150–400)
PMV BLD: 11.1 FL — SIGNIFICANT CHANGE UP (ref 7–13)
POTASSIUM SERPL-MCNC: 4.5 MMOL/L — SIGNIFICANT CHANGE UP (ref 3.5–5.3)
POTASSIUM SERPL-SCNC: 4.5 MMOL/L — SIGNIFICANT CHANGE UP (ref 3.5–5.3)
RBC # BLD: 5.7 M/UL — HIGH (ref 3.8–5.2)
RBC # FLD: 18.6 % — HIGH (ref 10.3–14.5)
SODIUM SERPL-SCNC: 138 MMOL/L — SIGNIFICANT CHANGE UP (ref 135–145)
WBC # BLD: 13.09 K/UL — HIGH (ref 3.8–10.5)
WBC # FLD AUTO: 13.09 K/UL — HIGH (ref 3.8–10.5)

## 2018-05-10 RX ORDER — OXYCODONE HYDROCHLORIDE 5 MG/1
1 TABLET ORAL
Qty: 16 | Refills: 0 | OUTPATIENT
Start: 2018-05-10 | End: 2018-05-13

## 2018-05-10 RX ADMIN — OXYCODONE HYDROCHLORIDE 10 MILLIGRAM(S): 5 TABLET ORAL at 09:20

## 2018-05-10 RX ADMIN — Medication 12.5 MILLIGRAM(S): at 05:22

## 2018-05-10 RX ADMIN — Medication 650 MILLIGRAM(S): at 12:23

## 2018-05-10 RX ADMIN — OXYCODONE HYDROCHLORIDE 10 MILLIGRAM(S): 5 TABLET ORAL at 08:50

## 2018-05-10 RX ADMIN — Medication 650 MILLIGRAM(S): at 02:32

## 2018-05-10 RX ADMIN — Medication 650 MILLIGRAM(S): at 02:02

## 2018-05-10 RX ADMIN — OXYCODONE HYDROCHLORIDE 10 MILLIGRAM(S): 5 TABLET ORAL at 04:33

## 2018-05-10 RX ADMIN — ENOXAPARIN SODIUM 40 MILLIGRAM(S): 100 INJECTION SUBCUTANEOUS at 11:25

## 2018-05-10 RX ADMIN — AMLODIPINE BESYLATE 5 MILLIGRAM(S): 2.5 TABLET ORAL at 05:22

## 2018-05-10 RX ADMIN — Medication 650 MILLIGRAM(S): at 13:08

## 2018-05-10 RX ADMIN — LOSARTAN POTASSIUM 50 MILLIGRAM(S): 100 TABLET, FILM COATED ORAL at 05:22

## 2018-05-10 RX ADMIN — OXYCODONE HYDROCHLORIDE 10 MILLIGRAM(S): 5 TABLET ORAL at 05:03

## 2018-05-10 NOTE — PROGRESS NOTE ADULT - ASSESSMENT
62F with incarcerated ventral hernia s/p ventral hernia repair    - Encourage OOB  - Cont regular diet  - PO pain control  - Wound care  - Okay to shower  - Home with rolling walker per PT rec  - D/C today with visiting nurse services and Augmentin for 7 days

## 2018-05-10 NOTE — PROGRESS NOTE ADULT - SUBJECTIVE AND OBJECTIVE BOX
INTERVAL HPI/OVERNIGHT EVENTS: Pt seen and examined. Eating and tolerating diet without issues. Low grade fever late last night most likely due to atelectasis.     MEDICATIONS  (STANDING):  amLODIPine   Tablet 5 milliGRAM(s) Oral daily  enoxaparin Injectable 40 milliGRAM(s) SubCutaneous daily  hydrochlorothiazide 12.5 milliGRAM(s) Oral daily  losartan 50 milliGRAM(s) Oral daily    MEDICATIONS  (PRN):  acetaminophen   Tablet. 650 milliGRAM(s) Oral every 6 hours PRN Mild Pain (1 - 3)  diphenhydrAMINE   Injectable 12.5 milliGRAM(s) IV Push every 4 hours PRN Pruritus  ondansetron Injectable 4 milliGRAM(s) IV Push every 6 hours PRN Nausea  oxyCODONE    IR 5 milliGRAM(s) Oral every 6 hours PRN Moderate Pain (4 - 6)  oxyCODONE    IR 10 milliGRAM(s) Oral every 6 hours PRN Severe Pain (7 - 10)      Vital Signs Last 24 Hrs  T(C): 36.4 (10 May 2018 13:44), Max: 38.1 (09 May 2018 21:38)  T(F): 97.6 (10 May 2018 13:44), Max: 100.5 (09 May 2018 21:38)  HR: 80 (10 May 2018 13:44) (77 - 93)  BP: 147/82 (10 May 2018 13:44) (130/78 - 160/86)  BP(mean): --  RR: 17 (10 May 2018 13:44) (16 - 18)  SpO2: 96% (10 May 2018 13:44) (96% - 100%)    PHYSICAL EXAM:      Constitutional: NAD    Gastrointestinal: NT, ND, dressings C/D/I, wound without erythema, no discharge, staples intact, serosanguinous fluid from both drains.      I&O's Detail    09 May 2018 07:01  -  10 May 2018 07:00  --------------------------------------------------------  IN:    Oral Fluid: 750 mL  Total IN: 750 mL    OUT:    Bulb: 18.5 mL    Bulb: 55 mL    Voided: 2750 mL  Total OUT: 2823.5 mL    Total NET: -2073.5 mL      10 May 2018 07:01  -  10 May 2018 14:00  --------------------------------------------------------  IN:  Total IN: 0 mL    OUT:    Bulb: 5 mL    Bulb: 10 mL  Total OUT: 15 mL    Total NET: -15 mL          LABS:                        12.4   13.09 )-----------( 270      ( 10 May 2018 07:23 )             40.1     05-10    138  |  97<L>  |  12  ----------------------------<  93  4.5   |  26  |  0.78    Ca    9.0      10 May 2018 06:00  Phos  3.9     05-10  Mg     2.3     05-10            RADIOLOGY & ADDITIONAL STUDIES:

## 2018-05-10 NOTE — PROGRESS NOTE ADULT - SUBJECTIVE AND OBJECTIVE BOX
A TEAM SURGERY PROGRESS NOTE    SUBJECTIVE: Pt seen at bedside. Patient has been tolerating diet without nausea/vomiting. Worked with PT yesterday, no needs upon discharge. Pain is well controlled on oral medications.      Vital Signs Last 24 Hrs  T(C): 37 (10 May 2018 05:20), Max: 38.1 (09 May 2018 21:38)  T(F): 98.6 (10 May 2018 05:20), Max: 100.5 (09 May 2018 21:38)  HR: 77 (10 May 2018 05:20) (71 - 93)  BP: 148/87 (10 May 2018 05:20) (112/71 - 160/86)  BP(mean): --  RR: 18 (10 May 2018 05:20) (16 - 18)  SpO2: 100% (10 May 2018 05:20) (96% - 100%)    Physical Exam  General: awake, alert,    Constitutional: NAD  Gastrointestinal: Soft, obese, non-distended, mildly tender, CAT drains serosanguinous fluid, incisions intact  Extremities: Grossly symmetric      I&O's Summary    09 May 2018 07:01  -  10 May 2018 07:00  --------------------------------------------------------  IN: 750 mL / OUT: 2823.5 mL / NET: -2073.5 mL      I&O's Detail    09 May 2018 07:01  -  10 May 2018 07:00  --------------------------------------------------------  IN:    Oral Fluid: 750 mL  Total IN: 750 mL    OUT:    Bulb: 18.5 mL    Bulb: 55 mL    Voided: 2750 mL  Total OUT: 2823.5 mL    Total NET: -2073.5 mL          MEDICATIONS  (STANDING):  amLODIPine   Tablet 5 milliGRAM(s) Oral daily  enoxaparin Injectable 40 milliGRAM(s) SubCutaneous daily  hydrochlorothiazide 12.5 milliGRAM(s) Oral daily  losartan 50 milliGRAM(s) Oral daily    MEDICATIONS  (PRN):  acetaminophen   Tablet. 650 milliGRAM(s) Oral every 6 hours PRN Mild Pain (1 - 3)  diphenhydrAMINE   Injectable 12.5 milliGRAM(s) IV Push every 4 hours PRN Pruritus  ondansetron Injectable 4 milliGRAM(s) IV Push every 6 hours PRN Nausea  oxyCODONE    IR 5 milliGRAM(s) Oral every 6 hours PRN Moderate Pain (4 - 6)  oxyCODONE    IR 10 milliGRAM(s) Oral every 6 hours PRN Severe Pain (7 - 10)      LABS:                        12.4   13.09 )-----------( 270      ( 10 May 2018 07:23 )             40.1     05-10    138  |  97<L>  |  12  ----------------------------<  93  4.5   |  26  |  0.78    Ca    9.0      10 May 2018 06:00  Phos  3.9     05-10  Mg     2.3     05-10

## 2018-05-10 NOTE — PROGRESS NOTE ADULT - ASSESSMENT
62F with incarcerated ventral hernia s/p ventral hernia repair with mesh (5/7).    Plan:  - Cont regular diet  - Encourage OOB  - PO pain control  - Discharge home today with VNS, no PT needs

## 2018-05-17 ENCOUNTER — MEDICATION RENEWAL (OUTPATIENT)
Age: 63
End: 2018-05-17

## 2018-05-31 ENCOUNTER — APPOINTMENT (OUTPATIENT)
Dept: INTERNAL MEDICINE | Facility: CLINIC | Age: 63
End: 2018-05-31
Payer: COMMERCIAL

## 2018-05-31 ENCOUNTER — RX RENEWAL (OUTPATIENT)
Age: 63
End: 2018-05-31

## 2018-06-04 ENCOUNTER — RESULT CHARGE (OUTPATIENT)
Age: 63
End: 2018-06-04

## 2018-06-04 ENCOUNTER — APPOINTMENT (OUTPATIENT)
Dept: INTERNAL MEDICINE | Facility: CLINIC | Age: 63
End: 2018-06-04
Payer: COMMERCIAL

## 2018-06-04 ENCOUNTER — NON-APPOINTMENT (OUTPATIENT)
Age: 63
End: 2018-06-04

## 2018-06-04 DIAGNOSIS — K43.6 OTHER AND UNSPECIFIED VENTRAL HERNIA WITH OBSTRUCTION, W/OUT GANGRENE: ICD-10-CM

## 2018-06-04 DIAGNOSIS — Z87.891 PERSONAL HISTORY OF NICOTINE DEPENDENCE: ICD-10-CM

## 2018-06-04 PROCEDURE — 81003 URINALYSIS AUTO W/O SCOPE: CPT | Mod: QW

## 2018-06-04 PROCEDURE — 93000 ELECTROCARDIOGRAM COMPLETE: CPT

## 2018-06-04 PROCEDURE — 99396 PREV VISIT EST AGE 40-64: CPT | Mod: 25

## 2018-06-08 ENCOUNTER — RECORD ABSTRACTING (OUTPATIENT)
Age: 63
End: 2018-06-08

## 2018-07-02 ENCOUNTER — OUTPATIENT (OUTPATIENT)
Dept: OUTPATIENT SERVICES | Facility: HOSPITAL | Age: 63
LOS: 1 days | End: 2018-07-02
Payer: COMMERCIAL

## 2018-07-02 ENCOUNTER — APPOINTMENT (OUTPATIENT)
Dept: CT IMAGING | Facility: IMAGING CENTER | Age: 63
End: 2018-07-02
Payer: COMMERCIAL

## 2018-07-02 DIAGNOSIS — I67.1 CEREBRAL ANEURYSM, NONRUPTURED: ICD-10-CM

## 2018-07-02 PROCEDURE — 70496 CT ANGIOGRAPHY HEAD: CPT

## 2018-07-02 PROCEDURE — 82565 ASSAY OF CREATININE: CPT

## 2018-07-02 PROCEDURE — 70496 CT ANGIOGRAPHY HEAD: CPT | Mod: 26

## 2018-07-18 NOTE — PATIENT PROFILE ADULT. - NS PRO CONTRA REFUSE FLU INFO
The patient is a 27y Female complaining of weakness. Risks/benefits discussed with patient or patient surrogate

## 2018-08-03 ENCOUNTER — APPOINTMENT (OUTPATIENT)
Dept: NEUROSURGERY | Facility: CLINIC | Age: 63
End: 2018-08-03
Payer: COMMERCIAL

## 2018-08-03 PROCEDURE — 99214 OFFICE O/P EST MOD 30 MIN: CPT

## 2018-08-06 ENCOUNTER — APPOINTMENT (OUTPATIENT)
Dept: INTERNAL MEDICINE | Facility: CLINIC | Age: 63
End: 2018-08-06
Payer: COMMERCIAL

## 2018-08-06 VITALS — HEIGHT: 63 IN | WEIGHT: 281 LBS | BODY MASS INDEX: 49.79 KG/M2

## 2018-08-06 VITALS — SYSTOLIC BLOOD PRESSURE: 130 MMHG | DIASTOLIC BLOOD PRESSURE: 70 MMHG

## 2018-08-06 PROCEDURE — 99215 OFFICE O/P EST HI 40 MIN: CPT

## 2018-08-06 NOTE — REVIEW OF SYSTEMS
[Fatigue] : fatigue [Palpitations] : palpitations [Dizziness] : dizziness [Fainting] : fainting [Negative] : Respiratory

## 2018-08-06 NOTE — HISTORY OF PRESENT ILLNESS
[de-identified] : This patient recently had a CT angiogram of the brain which revealed minuscule aneurysm which is being followed by neurosurgery. Aside from that everything was normal. The patient is complaining of episodes of near fainting. She has not had an EEG or cardiology evaluation.

## 2018-08-15 ENCOUNTER — APPOINTMENT (OUTPATIENT)
Dept: CARDIOLOGY | Facility: CLINIC | Age: 63
End: 2018-08-15
Payer: COMMERCIAL

## 2018-08-15 ENCOUNTER — NON-APPOINTMENT (OUTPATIENT)
Age: 63
End: 2018-08-15

## 2018-08-15 VITALS
BODY MASS INDEX: 49.6 KG/M2 | HEART RATE: 93 BPM | OXYGEN SATURATION: 100 % | WEIGHT: 280 LBS | SYSTOLIC BLOOD PRESSURE: 146 MMHG | DIASTOLIC BLOOD PRESSURE: 88 MMHG

## 2018-08-15 VITALS — DIASTOLIC BLOOD PRESSURE: 94 MMHG | SYSTOLIC BLOOD PRESSURE: 136 MMHG

## 2018-08-15 DIAGNOSIS — R55 SYNCOPE AND COLLAPSE: ICD-10-CM

## 2018-08-15 PROCEDURE — 99214 OFFICE O/P EST MOD 30 MIN: CPT

## 2018-08-15 PROCEDURE — 93000 ELECTROCARDIOGRAM COMPLETE: CPT

## 2018-08-24 ENCOUNTER — APPOINTMENT (OUTPATIENT)
Dept: CARDIOLOGY | Facility: CLINIC | Age: 63
End: 2018-08-24
Payer: COMMERCIAL

## 2018-08-24 PROCEDURE — 93306 TTE W/DOPPLER COMPLETE: CPT

## 2018-09-17 ENCOUNTER — RX RENEWAL (OUTPATIENT)
Age: 63
End: 2018-09-17

## 2018-10-01 ENCOUNTER — RX RENEWAL (OUTPATIENT)
Age: 63
End: 2018-10-01

## 2018-10-08 ENCOUNTER — APPOINTMENT (OUTPATIENT)
Dept: NEUROLOGY | Facility: CLINIC | Age: 63
End: 2018-10-08
Payer: COMMERCIAL

## 2018-10-08 PROCEDURE — 95911 NRV CNDJ TEST 9-10 STUDIES: CPT

## 2018-10-08 PROCEDURE — 95885 MUSC TST DONE W/NERV TST LIM: CPT

## 2018-10-11 ENCOUNTER — APPOINTMENT (OUTPATIENT)
Dept: INTERNAL MEDICINE | Facility: CLINIC | Age: 63
End: 2018-10-11

## 2018-10-12 ENCOUNTER — RECORD ABSTRACTING (OUTPATIENT)
Age: 63
End: 2018-10-12

## 2018-11-06 ENCOUNTER — LABORATORY RESULT (OUTPATIENT)
Age: 63
End: 2018-11-06

## 2018-11-06 ENCOUNTER — APPOINTMENT (OUTPATIENT)
Dept: INTERNAL MEDICINE | Facility: CLINIC | Age: 63
End: 2018-11-06
Payer: COMMERCIAL

## 2018-11-06 VITALS — HEIGHT: 63 IN | BODY MASS INDEX: 49.61 KG/M2 | WEIGHT: 280 LBS

## 2018-11-06 VITALS — SYSTOLIC BLOOD PRESSURE: 130 MMHG | DIASTOLIC BLOOD PRESSURE: 70 MMHG

## 2018-11-06 LAB
BILIRUB UR QL STRIP: NORMAL
CLARITY UR: CLEAR
COLLECTION METHOD: NORMAL
GLUCOSE UR-MCNC: NORMAL
HCG UR QL: 0.2 EU/DL
HGB UR QL STRIP.AUTO: NORMAL
KETONES UR-MCNC: NORMAL
LEUKOCYTE ESTERASE UR QL STRIP: NORMAL
NITRITE UR QL STRIP: NORMAL
PH UR STRIP: 7
PROT UR STRIP-MCNC: NORMAL
SP GR UR STRIP: 1.02

## 2018-11-06 PROCEDURE — 81003 URINALYSIS AUTO W/O SCOPE: CPT | Mod: QW

## 2018-11-06 PROCEDURE — 36415 COLL VENOUS BLD VENIPUNCTURE: CPT

## 2018-11-06 PROCEDURE — 99214 OFFICE O/P EST MOD 30 MIN: CPT | Mod: 25

## 2018-11-06 NOTE — REVIEW OF SYSTEMS
[Palpitations] : palpitations [Lower Ext Edema] : lower extremity edema [Shortness Of Breath] : shortness of breath [Dyspnea on Exertion] : dyspnea on exertion [Joint Pain] : joint pain [Joint Stiffness] : joint stiffness [Headache] : headache [Dizziness] : dizziness [Negative] : Genitourinary

## 2018-11-06 NOTE — HISTORY OF PRESENT ILLNESS
[de-identified] : Patient was recently diagnosed with carpal tunnel syndrome and is requesting the name of the hand specialist. In addition she's complaining of numbness on the right side of her body. She recently saw a neurologist but failed to mention this to her. In addition she is progressively gaining weight and would like blood test done to ascertain that

## 2018-11-06 NOTE — PHYSICAL EXAM
[Normal Outer Ear/Nose] : the outer ears and nose were normal in appearance [Normal Oropharynx] : the oropharynx was normal [Normal TMs] : both tympanic membranes were normal [No JVD] : no jugular venous distention [Supple] : supple [No Lymphadenopathy] : no lymphadenopathy [No Respiratory Distress] : no respiratory distress  [Clear to Auscultation] : lungs were clear to auscultation bilaterally [No Accessory Muscle Use] : no accessory muscle use [Normal Rate] : normal rate  [Regular Rhythm] : with a regular rhythm [Normal S1, S2] : normal S1 and S2 [No Rash] : no rash [No Skin Lesions] : no skin lesions [Normal Gait] : normal gait [Coordination Grossly Intact] : coordination grossly intact [No Focal Deficits] : no focal deficits [Deep Tendon Reflexes (DTR)] : deep tendon reflexes were 2+ and symmetric

## 2018-11-06 NOTE — ASSESSMENT
[FreeTextEntry1] : I referred this patient to our hand specialists, neurologist, dietitian, cardiologist. Blood work

## 2018-11-07 ENCOUNTER — MESSAGE (OUTPATIENT)
Age: 63
End: 2018-11-07

## 2018-11-07 ENCOUNTER — MEDICATION RENEWAL (OUTPATIENT)
Age: 63
End: 2018-11-07

## 2018-11-07 LAB
25(OH)D3 SERPL-MCNC: 97.5 NG/ML
ALBUMIN SERPL ELPH-MCNC: 4.4 G/DL
ALP BLD-CCNC: 107 U/L
ALT SERPL-CCNC: 22 U/L
ANION GAP SERPL CALC-SCNC: 12 MMOL/L
AST SERPL-CCNC: 24 U/L
BASOPHILS # BLD AUTO: 0.03 K/UL
BASOPHILS NFR BLD AUTO: 0.3 %
BILIRUB SERPL-MCNC: 0.2 MG/DL
BUN SERPL-MCNC: 16 MG/DL
CALCIUM SERPL-MCNC: 9.6 MG/DL
CHLORIDE SERPL-SCNC: 105 MMOL/L
CHOLEST SERPL-MCNC: 193 MG/DL
CHOLEST/HDLC SERPL: 4.8 RATIO
CO2 SERPL-SCNC: 28 MMOL/L
CREAT SERPL-MCNC: 0.83 MG/DL
EOSINOPHIL # BLD AUTO: 0.36 K/UL
EOSINOPHIL NFR BLD AUTO: 3.4 %
ERYTHROCYTE [SEDIMENTATION RATE] IN BLOOD BY WESTERGREN METHOD: 34 MM/HR
FERRITIN SERPL-MCNC: 45 NG/ML
GLUCOSE SERPL-MCNC: 126 MG/DL
HBA1C MFR BLD HPLC: 6.2 %
HCT VFR BLD CALC: 38.6 %
HDLC SERPL-MCNC: 40 MG/DL
HGB BLD-MCNC: 12.3 G/DL
IMM GRANULOCYTES NFR BLD AUTO: 0.4 %
LDLC SERPL CALC-MCNC: 113 MG/DL
LYMPHOCYTES # BLD AUTO: 3.98 K/UL
LYMPHOCYTES NFR BLD AUTO: 37.2 %
MAN DIFF?: NORMAL
MCHC RBC-ENTMCNC: 22.7 PG
MCHC RBC-ENTMCNC: 31.9 GM/DL
MCV RBC AUTO: 71.2 FL
MONOCYTES # BLD AUTO: 0.59 K/UL
MONOCYTES NFR BLD AUTO: 5.5 %
NEUTROPHILS # BLD AUTO: 5.7 K/UL
NEUTROPHILS NFR BLD AUTO: 53.2 %
PLATELET # BLD AUTO: 281 K/UL
POTASSIUM SERPL-SCNC: 4.3 MMOL/L
PROT SERPL-MCNC: 8 G/DL
RBC # BLD: 5.42 M/UL
RBC # FLD: 18.2 %
SAVE SPECIMEN: NORMAL
SODIUM SERPL-SCNC: 145 MMOL/L
T3RU NFR SERPL: 1.24 INDEX
T4 SERPL-MCNC: 5.9 UG/DL
TRIGL SERPL-MCNC: 202 MG/DL
TSH SERPL-ACNC: 3.47 UIU/ML
URATE SERPL-MCNC: 3.9 MG/DL
VIT B12 SERPL-MCNC: 1594 PG/ML
WBC # FLD AUTO: 10.7 K/UL

## 2018-11-23 ENCOUNTER — CLINICAL ADVICE (OUTPATIENT)
Age: 63
End: 2018-11-23

## 2018-11-23 LAB
CORTICOSTEROID BIND GLOBULIN: 3.1 MG/DL
CORTIS SERPL-MCNC: 3 UG/DL
CORTISOL, FREE: 0.06 UG/DL
PFCX: 1.9 %

## 2019-02-12 ENCOUNTER — APPOINTMENT (OUTPATIENT)
Dept: INTERNAL MEDICINE | Facility: CLINIC | Age: 64
End: 2019-02-12
Payer: COMMERCIAL

## 2019-02-12 VITALS — HEIGHT: 63 IN | BODY MASS INDEX: 51.91 KG/M2 | WEIGHT: 293 LBS

## 2019-02-12 DIAGNOSIS — M54.12 RADICULOPATHY, CERVICAL REGION: ICD-10-CM

## 2019-02-12 DIAGNOSIS — R53.83 OTHER FATIGUE: ICD-10-CM

## 2019-02-12 PROCEDURE — 99214 OFFICE O/P EST MOD 30 MIN: CPT

## 2019-02-12 RX ORDER — BENZONATATE 100 MG/1
100 CAPSULE ORAL 3 TIMES DAILY
Qty: 30 | Refills: 2 | Status: ACTIVE | COMMUNITY
Start: 2019-02-12 | End: 1900-01-01

## 2019-02-12 NOTE — HISTORY OF PRESENT ILLNESS
[de-identified] : This is a 63-year-old woman who is under great stress because her mother is dying of metastatic breast carcinoma. She does have a history of cerebral and dorsum and cervical radiculopathy. She comes in today with a major complaint of episodes of wheezing with a nonproductive cough. She denies any sputum or fever

## 2019-02-12 NOTE — PHYSICAL EXAM
[Well Nourished] : well nourished [PERRL] : pupils equal round and reactive to light [EOMI] : extraocular movements intact [Normal Oropharynx] : the oropharynx was normal [Normal TMs] : both tympanic membranes were normal [Supple] : supple [No Lymphadenopathy] : no lymphadenopathy [No Respiratory Distress] : no respiratory distress  [Clear to Auscultation] : lungs were clear to auscultation bilaterally [No Accessory Muscle Use] : no accessory muscle use [Normal Percussion] : the chest was normal to percussion [Regular Rhythm] : with a regular rhythm [Normal S1, S2] : normal S1 and S2 [No Murmur] : no murmur heard [Normal Supraclavicular Nodes] : no supraclavicular lymphadenopathy [Normal Posterior Cervical Nodes] : no posterior cervical lymphadenopathy [Normal Anterior Cervical Nodes] : no anterior cervical lymphadenopathy

## 2019-02-12 NOTE — ASSESSMENT
[FreeTextEntry1] : Problems\par Asthma- that presently is no evidence of any asthmatic component. The lungs are clear her spirometry was in fact normal. I treated her with Tessalon Perles p.r.n. for her cough\par Cerebral aneurysm- she is due to repeat her MRA in order was placed\par Cervical radiculopathy she was referred to physical therapy \par Her fatigue I believe is related to weight and loss of exertional reserve -

## 2019-03-04 ENCOUNTER — MEDICATION RENEWAL (OUTPATIENT)
Age: 64
End: 2019-03-04

## 2019-03-05 ENCOUNTER — RX RENEWAL (OUTPATIENT)
Age: 64
End: 2019-03-05

## 2019-03-26 ENCOUNTER — RX RENEWAL (OUTPATIENT)
Age: 64
End: 2019-03-26

## 2019-04-11 ENCOUNTER — MEDICATION RENEWAL (OUTPATIENT)
Age: 64
End: 2019-04-11

## 2019-04-25 ENCOUNTER — MEDICATION RENEWAL (OUTPATIENT)
Age: 64
End: 2019-04-25

## 2019-05-08 ENCOUNTER — FORM ENCOUNTER (OUTPATIENT)
Age: 64
End: 2019-05-08

## 2019-05-09 ENCOUNTER — APPOINTMENT (OUTPATIENT)
Dept: MRI IMAGING | Facility: IMAGING CENTER | Age: 64
End: 2019-05-09
Payer: COMMERCIAL

## 2019-05-09 ENCOUNTER — OUTPATIENT (OUTPATIENT)
Dept: OUTPATIENT SERVICES | Facility: HOSPITAL | Age: 64
LOS: 1 days | End: 2019-05-09
Payer: COMMERCIAL

## 2019-05-09 DIAGNOSIS — I63.9 CEREBRAL INFARCTION, UNSPECIFIED: ICD-10-CM

## 2019-05-09 PROCEDURE — 70544 MR ANGIOGRAPHY HEAD W/O DYE: CPT | Mod: 26

## 2019-05-09 PROCEDURE — 70544 MR ANGIOGRAPHY HEAD W/O DYE: CPT

## 2019-05-17 ENCOUNTER — TRANSCRIPTION ENCOUNTER (OUTPATIENT)
Age: 64
End: 2019-05-17

## 2019-05-17 ENCOUNTER — APPOINTMENT (OUTPATIENT)
Dept: INTERNAL MEDICINE | Facility: CLINIC | Age: 64
End: 2019-05-17

## 2019-05-21 ENCOUNTER — APPOINTMENT (OUTPATIENT)
Dept: INTERNAL MEDICINE | Facility: CLINIC | Age: 64
End: 2019-05-21
Payer: COMMERCIAL

## 2019-05-21 ENCOUNTER — LABORATORY RESULT (OUTPATIENT)
Age: 64
End: 2019-05-21

## 2019-05-21 ENCOUNTER — NON-APPOINTMENT (OUTPATIENT)
Age: 64
End: 2019-05-21

## 2019-05-21 VITALS — OXYGEN SATURATION: 97 % | HEART RATE: 81 BPM

## 2019-05-21 VITALS — DIASTOLIC BLOOD PRESSURE: 70 MMHG | SYSTOLIC BLOOD PRESSURE: 130 MMHG

## 2019-05-21 DIAGNOSIS — I51.9 HEART DISEASE, UNSPECIFIED: ICD-10-CM

## 2019-05-21 DIAGNOSIS — R53.81 OTHER MALAISE: ICD-10-CM

## 2019-05-21 PROCEDURE — 36415 COLL VENOUS BLD VENIPUNCTURE: CPT

## 2019-05-21 PROCEDURE — 99214 OFFICE O/P EST MOD 30 MIN: CPT | Mod: 25

## 2019-05-21 PROCEDURE — 93000 ELECTROCARDIOGRAM COMPLETE: CPT

## 2019-05-21 RX ORDER — GABAPENTIN 300 MG/1
300 CAPSULE ORAL
Qty: 270 | Refills: 1 | Status: DISCONTINUED | COMMUNITY
Start: 2018-10-01 | End: 2019-05-21

## 2019-05-21 RX ORDER — DIAZEPAM 5 MG/1
5 TABLET ORAL TWICE DAILY
Qty: 60 | Refills: 0 | Status: ACTIVE | COMMUNITY
Start: 2018-11-07

## 2019-05-22 LAB
25(OH)D3 SERPL-MCNC: 69.9 NG/ML
ALBUMIN SERPL ELPH-MCNC: 4.5 G/DL
ALP BLD-CCNC: 112 U/L
ALT SERPL-CCNC: 26 U/L
ANION GAP SERPL CALC-SCNC: 12 MMOL/L
AST SERPL-CCNC: 23 U/L
BASOPHILS # BLD AUTO: 0.07 K/UL
BASOPHILS NFR BLD AUTO: 0.6 %
BILIRUB SERPL-MCNC: <0.2 MG/DL
BUN SERPL-MCNC: 22 MG/DL
CALCIUM SERPL-MCNC: 9.6 MG/DL
CHLORIDE SERPL-SCNC: 101 MMOL/L
CHOLEST SERPL-MCNC: 187 MG/DL
CHOLEST/HDLC SERPL: 5.3 RATIO
CO2 SERPL-SCNC: 28 MMOL/L
CREAT SERPL-MCNC: 0.85 MG/DL
EOSINOPHIL # BLD AUTO: 0.3 K/UL
EOSINOPHIL NFR BLD AUTO: 2.6 %
ESTIMATED AVERAGE GLUCOSE: 143 MG/DL
FERRITIN SERPL-MCNC: 44 NG/ML
FOLATE SERPL-MCNC: >20 NG/ML
GLUCOSE SERPL-MCNC: 130 MG/DL
HBA1C MFR BLD HPLC: 6.6 %
HCT VFR BLD CALC: 42.1 %
HDLC SERPL-MCNC: 35 MG/DL
HGB BLD-MCNC: 12.1 G/DL
IMM GRANULOCYTES NFR BLD AUTO: 0.6 %
LDLC SERPL CALC-MCNC: 98 MG/DL
LYMPHOCYTES # BLD AUTO: 3.13 K/UL
LYMPHOCYTES NFR BLD AUTO: 27 %
MAN DIFF?: NORMAL
MCHC RBC-ENTMCNC: 21.6 PG
MCHC RBC-ENTMCNC: 28.7 GM/DL
MCV RBC AUTO: 75.2 FL
MONOCYTES # BLD AUTO: 0.83 K/UL
MONOCYTES NFR BLD AUTO: 7.2 %
NEUTROPHILS # BLD AUTO: 7.2 K/UL
NEUTROPHILS NFR BLD AUTO: 62 %
PLATELET # BLD AUTO: 321 K/UL
POTASSIUM SERPL-SCNC: 4.5 MMOL/L
PROT SERPL-MCNC: 7.3 G/DL
RBC # BLD: 5.6 M/UL
RBC # FLD: 18.4 %
SAVE SPECIMEN: NORMAL
SODIUM SERPL-SCNC: 141 MMOL/L
T3RU NFR SERPL: 1.2 TBI
T4 SERPL-MCNC: 5.7 UG/DL
TRIGL SERPL-MCNC: 269 MG/DL
TSH SERPL-ACNC: 1.98 UIU/ML
URATE SERPL-MCNC: 5.8 MG/DL
VIT B12 SERPL-MCNC: >2000 PG/ML
WBC # FLD AUTO: 11.6 K/UL

## 2019-06-27 ENCOUNTER — APPOINTMENT (OUTPATIENT)
Dept: INTERNAL MEDICINE | Facility: CLINIC | Age: 64
End: 2019-06-27
Payer: COMMERCIAL

## 2019-06-27 VITALS — HEIGHT: 63 IN | WEIGHT: 293 LBS | BODY MASS INDEX: 51.91 KG/M2

## 2019-06-27 VITALS — SYSTOLIC BLOOD PRESSURE: 130 MMHG | DIASTOLIC BLOOD PRESSURE: 70 MMHG

## 2019-06-27 PROCEDURE — 99214 OFFICE O/P EST MOD 30 MIN: CPT

## 2019-06-27 RX ORDER — POTASSIUM CHLORIDE 750 MG/1
10 TABLET, FILM COATED, EXTENDED RELEASE ORAL
Qty: 90 | Refills: 3 | Status: ACTIVE | COMMUNITY
Start: 2019-06-27 | End: 1900-01-01

## 2019-06-27 NOTE — HISTORY OF PRESENT ILLNESS
[de-identified] : This is a 63-year-old woman that he was a history of cerebral aneurysm, edema both legs, hypertension, lumbar radiculopathy, peripheral neuropathy who she is due for followup. The patient has not seen the neurosurgeon as requested nor she using her CPAP. In addition she hasn't lost weight

## 2019-06-27 NOTE — PHYSICAL EXAM
[PERRL] : pupils equal round and reactive to light [Well Nourished] : well nourished [EOMI] : extraocular movements intact [Normal TMs] : both tympanic membranes were normal [Normal Oropharynx] : the oropharynx was normal [Supple] : supple [No Lymphadenopathy] : no lymphadenopathy [No Respiratory Distress] : no respiratory distress  [Clear to Auscultation] : lungs were clear to auscultation bilaterally [No Accessory Muscle Use] : no accessory muscle use [Normal Percussion] : the chest was normal to percussion [Regular Rhythm] : with a regular rhythm [Normal S1, S2] : normal S1 and S2 [Normal Supraclavicular Nodes] : no supraclavicular lymphadenopathy [No Murmur] : no murmur heard [Normal Posterior Cervical Nodes] : no posterior cervical lymphadenopathy [Normal Anterior Cervical Nodes] : no anterior cervical lymphadenopathy [de-identified] : 2+ edema

## 2019-06-27 NOTE — ASSESSMENT
[FreeTextEntry1] : Problems\par Cerebral aneurysm\par Hypertension\par Lumbar radiculopathy\par Obesity\par Peripheral neuropathy\par Edema both legs\par Assessment\par This is a patient who is 63 years old who is significantly obese. She has not lost weight in the past 6 months. In addition she has a history of hypertension and peripheral and lumbar radiculopathy and neuropathy. She has been indiscriminate with her sodium intake. Her physical examination today revealed 2-3+ peripheral edema. I instructed patient on the importance of a low sodium diet and also started her on furosemide 20 mg daily and KCl tablets 10 mEq daily with largest and to return in 4 weeks

## 2019-06-28 ENCOUNTER — OTHER (OUTPATIENT)
Age: 64
End: 2019-06-28

## 2019-07-18 ENCOUNTER — RX RENEWAL (OUTPATIENT)
Age: 64
End: 2019-07-18

## 2019-07-22 ENCOUNTER — MEDICATION RENEWAL (OUTPATIENT)
Age: 64
End: 2019-07-22

## 2019-08-09 ENCOUNTER — APPOINTMENT (OUTPATIENT)
Dept: INTERNAL MEDICINE | Facility: CLINIC | Age: 64
End: 2019-08-09
Payer: COMMERCIAL

## 2019-08-09 VITALS — BODY MASS INDEX: 51.38 KG/M2 | WEIGHT: 290 LBS | HEIGHT: 63 IN

## 2019-08-09 VITALS — DIASTOLIC BLOOD PRESSURE: 70 MMHG | SYSTOLIC BLOOD PRESSURE: 130 MMHG

## 2019-08-09 DIAGNOSIS — M17.0 BILATERAL PRIMARY OSTEOARTHRITIS OF KNEE: ICD-10-CM

## 2019-08-09 PROCEDURE — 99215 OFFICE O/P EST HI 40 MIN: CPT

## 2019-08-09 NOTE — REVIEW OF SYSTEMS
[Chest Pain] : no chest pain [Lower Ext Edema] : lower extremity edema [Shortness Of Breath] : shortness of breath [Dyspnea on Exertion] : dyspnea on exertion [Joint Pain] : joint pain [Joint Stiffness] : joint stiffness [Muscle Weakness] : muscle weakness [Back Pain] : back pain [Unsteady Walking] : ataxia [Negative] : Gastrointestinal

## 2019-08-09 NOTE — ASSESSMENT
[FreeTextEntry1] : Problems\par Hypertension elevated A1c\par Osteoarthritis severe of both knees\par Lumbar radiculopathy lumbar osteoarthritis\par Obstructive sleep apnea\par Assessment\par I explained to patient the majority of her problems are related to her weight and the impact of the weight on her back and on her knees and on her sleeping debility. I advised the patient to join [employer] and also refer the patient for a badly needed mammogram and sonogram and to neurosurgery and to orthopedics\par

## 2019-08-09 NOTE — HISTORY OF PRESENT ILLNESS
[de-identified] : This is a 64-year-old woman who suffers from massive obesity with complications of hypertension, severe lumbosacral arthritis and spinal stenosis with radiculopathy osteoarthritis of both knees, sleep apnea, fibrocystic breast disease. She is here today for a recheck on her blood pressure and also because of her back and knees she's

## 2019-08-09 NOTE — PHYSICAL EXAM
[Normal] : normal rate, regular rhythm, normal S1 and S2 and no murmur heard [Declined Breast Exam] : declined breast exam  [Soft] : abdomen soft [No HSM] : no HSM [de-identified] : osteoarthritis [de-identified] : Ataxic gait

## 2019-08-19 ENCOUNTER — FORM ENCOUNTER (OUTPATIENT)
Age: 64
End: 2019-08-19

## 2019-08-20 ENCOUNTER — OUTPATIENT (OUTPATIENT)
Dept: OUTPATIENT SERVICES | Facility: HOSPITAL | Age: 64
LOS: 1 days | End: 2019-08-20
Payer: COMMERCIAL

## 2019-08-20 ENCOUNTER — APPOINTMENT (OUTPATIENT)
Dept: ULTRASOUND IMAGING | Facility: IMAGING CENTER | Age: 64
End: 2019-08-20
Payer: COMMERCIAL

## 2019-08-20 DIAGNOSIS — Z00.8 ENCOUNTER FOR OTHER GENERAL EXAMINATION: ICD-10-CM

## 2019-08-20 PROCEDURE — 76641 ULTRASOUND BREAST COMPLETE: CPT

## 2019-08-20 PROCEDURE — 76641 ULTRASOUND BREAST COMPLETE: CPT | Mod: 26,50

## 2019-08-28 ENCOUNTER — APPOINTMENT (OUTPATIENT)
Dept: ORTHOPEDIC SURGERY | Facility: CLINIC | Age: 64
End: 2019-08-28
Payer: COMMERCIAL

## 2019-08-28 VITALS
BODY MASS INDEX: 49.17 KG/M2 | WEIGHT: 288 LBS | DIASTOLIC BLOOD PRESSURE: 86 MMHG | SYSTOLIC BLOOD PRESSURE: 134 MMHG | HEART RATE: 92 BPM | HEIGHT: 64 IN

## 2019-08-28 DIAGNOSIS — S82.092S OTHER FRACTURE OF LEFT PATELLA, SEQUELA: ICD-10-CM

## 2019-08-28 PROCEDURE — 99203 OFFICE O/P NEW LOW 30 MIN: CPT

## 2019-08-28 PROCEDURE — 73562 X-RAY EXAM OF KNEE 3: CPT

## 2019-08-29 ENCOUNTER — MEDICATION RENEWAL (OUTPATIENT)
Age: 64
End: 2019-08-29

## 2019-08-29 ENCOUNTER — APPOINTMENT (OUTPATIENT)
Dept: INTERNAL MEDICINE | Facility: CLINIC | Age: 64
End: 2019-08-29
Payer: COMMERCIAL

## 2019-08-29 VITALS — DIASTOLIC BLOOD PRESSURE: 70 MMHG | SYSTOLIC BLOOD PRESSURE: 130 MMHG

## 2019-08-29 DIAGNOSIS — Z86.79 PERSONAL HISTORY OF OTHER DISEASES OF THE CIRCULATORY SYSTEM: ICD-10-CM

## 2019-08-29 PROCEDURE — 99214 OFFICE O/P EST MOD 30 MIN: CPT

## 2019-08-29 NOTE — PHYSICAL EXAM
[Declined Breast Exam] : declined breast exam  [Soft] : abdomen soft [No HSM] : no HSM [Normal] : soft, non-tender, non-distended, no masses palpated, no HSM and normal bowel sounds [de-identified] : osteoarthritis [de-identified] : Ataxic gait

## 2019-08-29 NOTE — HISTORY OF PRESENT ILLNESS
[de-identified] : Which is being followed by neurosurgerynd neurology. In addition she has obstructive sleep apnea. Also she ha orthopedist.e is also here for a blood pressure check

## 2019-08-29 NOTE — ASSESSMENT
[FreeTextEntry1] : Problem cerebral aneursm\par Elevated A1c\par Hypertension\par Osteoarthritis of knees\par Sleep apnea\par Assessment\par The patient has a 1 mm posterolateral aneurysm which is being followed by a combination of neurology and neurosurgery. Socially he is at this point it's too small to require any invasive therapy. In addition she has significant osteoarthritis of both knees aggravated by her weight she was seen by orthopedics who felt that at this point she needs physical therapy and they would avoid surgery her blood pressure is well controlledHer A1c was within range. She was advised to lose weight and continue with physical therapy

## 2019-09-03 ENCOUNTER — EMERGENCY (EMERGENCY)
Facility: HOSPITAL | Age: 64
LOS: 1 days | Discharge: HOME | End: 2019-09-03
Attending: STUDENT IN AN ORGANIZED HEALTH CARE EDUCATION/TRAINING PROGRAM | Admitting: STUDENT IN AN ORGANIZED HEALTH CARE EDUCATION/TRAINING PROGRAM
Payer: OTHER MISCELLANEOUS

## 2019-09-03 VITALS
DIASTOLIC BLOOD PRESSURE: 50 MMHG | RESPIRATION RATE: 18 BRPM | TEMPERATURE: 98 F | HEART RATE: 68 BPM | SYSTOLIC BLOOD PRESSURE: 127 MMHG

## 2019-09-03 PROCEDURE — 99284 EMERGENCY DEPT VISIT MOD MDM: CPT | Mod: 25

## 2019-09-03 PROCEDURE — 73110 X-RAY EXAM OF WRIST: CPT | Mod: 26,LT

## 2019-09-03 PROCEDURE — 73120 X-RAY EXAM OF HAND: CPT | Mod: 26,LT

## 2019-09-03 PROCEDURE — 72100 X-RAY EXAM L-S SPINE 2/3 VWS: CPT | Mod: 26

## 2019-09-03 PROCEDURE — 29125 APPL SHORT ARM SPLINT STATIC: CPT | Mod: LT

## 2019-09-03 PROCEDURE — 73090 X-RAY EXAM OF FOREARM: CPT | Mod: 26,LT

## 2019-09-03 RX ORDER — IBUPROFEN 200 MG
600 TABLET ORAL ONCE
Refills: 0 | Status: COMPLETED | OUTPATIENT
Start: 2019-09-03 | End: 2019-09-03

## 2019-09-03 RX ORDER — ACETAMINOPHEN 500 MG
975 TABLET ORAL ONCE
Refills: 0 | Status: COMPLETED | OUTPATIENT
Start: 2019-09-03 | End: 2019-09-03

## 2019-09-03 RX ADMIN — Medication 975 MILLIGRAM(S): at 21:01

## 2019-09-03 RX ADMIN — Medication 600 MILLIGRAM(S): at 21:02

## 2019-09-03 NOTE — ED ADULT NURSE NOTE - CHIEF COMPLAINT QUOTE
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 Pt sent for orthopedics to cast it

## 2019-09-03 NOTE — ED PROVIDER NOTE - PATIENT PORTAL LINK FT
You can access the FollowMyHealth Patient Portal offered by [facility] by registering at the following website: [email or web address]. By joining [facility]’s FollowMyHealth portal, you will also be able to view your health information using other applications (apps) compatible with our system.

## 2019-09-03 NOTE — ED PROVIDER NOTE - ATTENDING CONTRIBUTION TO CARE
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 was diagnosed at urgent care. patient had a fall in school earlier today. was given "ortho" f/u that was a pain management office so patient reports to ED. reports lower back since fall and denies fever chill, numbness weakness, bowel bladder incontinence. no midline spinal ttp on exam. left wrist tenderness. distal and proximal to injury site neurovascularly intact. plan: symptom relief. XR-results reviewed with patient. sugar tong splint applied by MARIELA Cummings. patient given close ortho out-patient follow up.

## 2019-09-03 NOTE — ED ADULT NURSE NOTE - OBJECTIVE STATEMENT
63 yo F AAOx4 received to intake 3 s/p mechanical fall, arrives with RUE in sling, c/o R wrist/arm/shoulder pain, - ROM at this time due to pain, denies LOC/head trauma, no obvious signs of trauma/injury/bleeding, endorses no further complaints, medicated as per MD order, pt to Xray, will monitor

## 2019-09-03 NOTE — ED PROVIDER NOTE - PHYSICAL EXAMINATION
left wrist: +swelling and tenderness over distal radius. no bruising. neurovascularly intact. limited rom of fingers and wrist secondary to pain. able to wiggle fingers. full rom of all other joints b/l. forearm and elbow non-tender.

## 2019-09-03 NOTE — ED PROVIDER NOTE - OBJECTIVE STATEMENT
63 y/o female with pmhx of HTN, neuropathy, presents to ED c/o left wrist fracture and pain since this morning s/p mechanical fall. Right hand dominant. States at 9am she was in a school gym and tripped over someone's foot. Pt fell on to her lower back and her left hand/wrist. No head trauma, no loc, no blood thinners. Pt went to [facility] urgent care, had wrist xray performed and as per results has intra-articular distal radial fracture. Pt was splinted and placed in sling. Pt went to follow up at Ortho office [facility] arranged, however ended up being a pain management office and so came to ER. No fever, chills, cp, sob, abd pain, n/v, weakness, numbness, tingling, incontinence. Admits to mild lower back pain that got better with ibuprofen taken 8 hours ago. Not allergic to percocet, tolerates tylenol, vomits with oxycodone.

## 2019-09-03 NOTE — ED PROVIDER NOTE - NSFOLLOWUPINSTRUCTIONS_ED_ALL_ED_FT
Follow up with Ortho within the week, Dr. Reis call [phone] to make an appointment. Take Motrin 600mg every 6-8 hrs as needed with food for pain. Take Tylenol 650mg every 8 hrs as needed for pain. Keep splint and sling on until you see Orthopedic physician. Worsening pain, numbness, weakness, swelling or new concerning symptoms return to the Emergency Department.

## 2019-09-03 NOTE — ED PROVIDER NOTE - PROGRESS NOTE DETAILS
MARIELA mckinley ortho resident Dr. Rodrigo Sandra who reviewed xrays, suggesting to place in sugar tong splint and can f.u outpatient with Dr. Reis this week. pt amenable with plan.

## 2019-09-03 NOTE — ED PROVIDER NOTE - CLINICAL SUMMARY MEDICAL DECISION MAKING FREE TEXT BOX
65 y/o female with pmhx of HTN, neuropathy, presents to ED c/o left wrist fracture and pain since this morning s/p mechanical fall. Right hand dominant. mild right lower back pain, +muscle spasm. plan to repeat xrays, hand, wrist and forearm. ortho consult. ibuprofen/tylenol for pain control.

## 2019-09-05 ENCOUNTER — APPOINTMENT (OUTPATIENT)
Dept: ORTHOPEDIC SURGERY | Facility: CLINIC | Age: 64
End: 2019-09-05
Payer: COMMERCIAL

## 2019-09-05 VITALS — HEIGHT: 64 IN | BODY MASS INDEX: 47.8 KG/M2 | WEIGHT: 280 LBS

## 2019-09-05 PROCEDURE — 99203 OFFICE O/P NEW LOW 30 MIN: CPT | Mod: 25

## 2019-09-05 PROCEDURE — 29075 APPL CST ELBW FNGR SHORT ARM: CPT | Mod: LT

## 2019-09-05 PROCEDURE — 99213 OFFICE O/P EST LOW 20 MIN: CPT | Mod: 25

## 2019-09-05 NOTE — ADDENDUM
[FreeTextEntry1] : I, Orly Berkowitz wrote this note acting as a scribe for Dr. Gregory Fregoso on Sep 05, 2019.

## 2019-09-05 NOTE — END OF VISIT
[FreeTextEntry3] : I, Gregory Fregoso MD, ordering physician, have read and attest that all the information, medical decision making and discharge instructions within are true and accurate.

## 2019-09-05 NOTE — HISTORY OF PRESENT ILLNESS
[de-identified] : Pt is a 63 y/o RHD female  with left wrist injury.  She tripped and fell over someone's foot while participating in a class activity on Tuesday 9/3/19.  She fell sideways and tried to break her fall with the left arm and subsequently landed onto the outstretched hand against some bleachers. She also partially landed on her back. She went to [facility] where xrays were taken.  She was told that she fractured her wrist.  She was placed in a splint.  The pain became severe that evening and she went to [facility] ED. New x-rays were performed and revealed a  minimally displaced fracture of the distal radial metaphysis with intra-articular extension of the fracture line. She was placed into a posterior splint and sling and advised to followup with a specialist. She continues to have pain and swelling.  She takes Motrin and Tylenol for pain.  She was told that the fracture was intra-articular and may require surgery.  She states that her right long and ring fingers are tingling which was present in the left hand prior to the fall.

## 2019-09-05 NOTE — DISCUSSION/SUMMARY
[de-identified] : The underlying pathophysiology was reviewed with the patient. Treatment options were discussed including; observation, NSAIDS, and cast immobilization. \par \par A left short arm cast was applied. Cast care instructions were reviewed. She was placed into a new sling. \par NSAIDs as tolerated. \par Gentle ROM exercises in the cast were advised.\par Follow up in 2 weeks for repeat x-rays in the cast.

## 2019-09-05 NOTE — PHYSICAL EXAM
[de-identified] : Patient is WDWN, alert, and in no acute distress. Breathing is unlabored. She is grossly oriented to person, place, and time. \par \par Left Wrist: Sugar tong  splint and sling were removed. There is tenderness dorsally with localized swelling to the hand and fingers. No visible deformities. The ROM is full in the shoulder and elbow. She is able to move the fingers. There is no joint instability on provocative testing. \par Strength: extension, flexion, ulnar deviation and radial deviation 5/5.  [de-identified] : X-rays of the left wrist taken on 09/03/2019 at [facility] revealed a minimally displaced fracture of the distal radial metaphysis with intra-articular extension of the fracture line. \par

## 2019-09-05 NOTE — CONSULT LETTER
[Dear  ___] : Dear  [unfilled], [Courtesy Letter:] : I had the pleasure of seeing your patient, [unfilled], in my office today. [Please see my note below.] : Please see my note below. [Sincerely,] : Sincerely, [FreeTextEntry2] : CHERYL NAVARRO [FreeTextEntry3] : Gregory Fregoso MD

## 2019-09-16 ENCOUNTER — APPOINTMENT (OUTPATIENT)
Dept: ORTHOPEDIC SURGERY | Facility: CLINIC | Age: 64
End: 2019-09-16
Payer: COMMERCIAL

## 2019-09-16 PROCEDURE — 29125 APPL SHORT ARM SPLINT STATIC: CPT | Mod: LT

## 2019-09-16 PROCEDURE — 29075 APPL CST ELBW FNGR SHORT ARM: CPT | Mod: LT

## 2019-09-16 PROCEDURE — 73110 X-RAY EXAM OF WRIST: CPT | Mod: LT

## 2019-09-16 PROCEDURE — 99213 OFFICE O/P EST LOW 20 MIN: CPT | Mod: 25

## 2019-09-16 NOTE — PHYSICAL EXAM
[de-identified] : Patient is WDWN, alert, and in no acute distress. Breathing is unlabored. She is grossly oriented to person, place, and time. \par \par Left Wrist: Short arm cast was removed. There is tenderness dorsally with localized swelling to the hand and fingers. No visible deformities. The ROM is full in the shoulder and elbow. She is able to move the fingers. There is no joint instability on provocative testing. \par Strength: extension, flexion, ulnar deviation and radial deviation 5/5.  [de-identified] : AP, lateral and oblique views of the left wrist in a cast were obtained today and revealed a minimally displaced fracture of the distal radial metaphysis with intra-articular extension of the fracture line. Fracture has not shifted in position.

## 2019-09-16 NOTE — ADDENDUM
[FreeTextEntry1] : I, Orly Berkowitz wrote this note acting as a scribe for Dr. Gregory Fregoso on Sep 16, 2019.

## 2019-09-16 NOTE — DISCUSSION/SUMMARY
[de-identified] : Left short arm cast was removed and a new one was applied.\par NSAIDs as tolerated. \par Gentle ROM exercises in the cast were advised.\par She will return in 2 weeks for repeat x-rays in the cast.

## 2019-09-16 NOTE — HISTORY OF PRESENT ILLNESS
[de-identified] : Pt is a 63 y/o RHD female  with left wrist injury.  She tripped and fell over someone's foot while participating in a class activity on Tuesday 9/3/19.  She fell sideways and tried to break her fall with the left arm and subsequently landed onto the outstretched hand against some bleachers. She also partially landed on her back. She went to [facility] where xrays were taken.  She was told that she fractured her wrist.  She was placed in a splint.  The pain became severe that evening and she went to [facility] ED. New x-rays were performed and revealed a  minimally displaced fracture of the distal radial metaphysis with intra-articular extension of the fracture line. She was placed into a posterior splint and sling and advised to followup with a specialist. She states that her right long and ring fingers are tingling which was present in the left hand prior to the fall. The patient was placed into a short arm cast in this office on 09/05/2019. She presents today stating that she may have gotten the cast wet and would like to have it changed. She has no new complaints at this time.

## 2019-10-03 ENCOUNTER — APPOINTMENT (OUTPATIENT)
Dept: ORTHOPEDIC SURGERY | Facility: CLINIC | Age: 64
End: 2019-10-03
Payer: COMMERCIAL

## 2019-10-03 DIAGNOSIS — S92.415A NONDISPLACED FRACTURE OF PROXIMAL PHALANX OF LEFT GREAT TOE, INITIAL ENCOUNTER FOR CLOSED FRACTURE: ICD-10-CM

## 2019-10-03 DIAGNOSIS — S52.572A OTHER INTRAARTICULAR FRACTURE OF LOWER END OF LEFT RADIUS, INITIAL ENCOUNTER FOR CLOSED FRACTURE: ICD-10-CM

## 2019-10-03 DIAGNOSIS — M79.675 PAIN IN LEFT TOE(S): ICD-10-CM

## 2019-10-03 DIAGNOSIS — S99.241G: ICD-10-CM

## 2019-10-03 PROCEDURE — 99214 OFFICE O/P EST MOD 30 MIN: CPT

## 2019-10-03 PROCEDURE — 73110 X-RAY EXAM OF WRIST: CPT | Mod: LT

## 2019-10-03 PROCEDURE — 73660 X-RAY EXAM OF TOE(S): CPT | Mod: LT

## 2019-10-04 ENCOUNTER — LABORATORY RESULT (OUTPATIENT)
Age: 64
End: 2019-10-04

## 2019-10-04 ENCOUNTER — NON-APPOINTMENT (OUTPATIENT)
Age: 64
End: 2019-10-04

## 2019-10-04 ENCOUNTER — MEDICATION RENEWAL (OUTPATIENT)
Age: 64
End: 2019-10-04

## 2019-10-04 ENCOUNTER — APPOINTMENT (OUTPATIENT)
Dept: INTERNAL MEDICINE | Facility: CLINIC | Age: 64
End: 2019-10-04
Payer: COMMERCIAL

## 2019-10-04 VITALS — HEIGHT: 64 IN | BODY MASS INDEX: 47.97 KG/M2 | WEIGHT: 281 LBS

## 2019-10-04 DIAGNOSIS — Z86.79 PERSONAL HISTORY OF OTHER DISEASES OF THE CIRCULATORY SYSTEM: ICD-10-CM

## 2019-10-04 DIAGNOSIS — M54.16 RADICULOPATHY, LUMBAR REGION: ICD-10-CM

## 2019-10-04 PROBLEM — S99.241G: Status: ACTIVE | Noted: 2019-10-04

## 2019-10-04 PROBLEM — S52.572A OTHER CLOSED INTRA-ARTICULAR FRACTURE OF DISTAL END OF LEFT RADIUS, INITIAL ENCOUNTER: Status: ACTIVE | Noted: 2019-09-05

## 2019-10-04 PROBLEM — S92.415A CLOSED NONDISPLACED FRACTURE OF PROXIMAL PHALANX OF LEFT GREAT TOE, INITIAL ENCOUNTER: Status: ACTIVE | Noted: 2019-10-04

## 2019-10-04 PROBLEM — M79.675 PAIN OF TOE OF LEFT FOOT: Status: ACTIVE | Noted: 2019-10-03

## 2019-10-04 LAB
BILIRUB UR QL STRIP: NORMAL
CLARITY UR: CLEAR
COLLECTION METHOD: NORMAL
GLUCOSE UR-MCNC: NORMAL
HCG UR QL: 0.2 EU/DL
HGB UR QL STRIP.AUTO: NORMAL
KETONES UR-MCNC: NORMAL
LEUKOCYTE ESTERASE UR QL STRIP: NORMAL
NITRITE UR QL STRIP: NORMAL
PH UR STRIP: 5.5
PROT UR STRIP-MCNC: NORMAL
SP GR UR STRIP: 1.02

## 2019-10-04 PROCEDURE — 93000 ELECTROCARDIOGRAM COMPLETE: CPT

## 2019-10-04 PROCEDURE — 99396 PREV VISIT EST AGE 40-64: CPT | Mod: 25

## 2019-10-04 PROCEDURE — 81003 URINALYSIS AUTO W/O SCOPE: CPT | Mod: QW

## 2019-10-04 PROCEDURE — 36415 COLL VENOUS BLD VENIPUNCTURE: CPT

## 2019-10-04 NOTE — HISTORY OF PRESENT ILLNESS
[de-identified] : Pt is a 63 y/o RHD female  with left wrist injury.  She tripped and fell over someone's foot while participating in a class activity on Tuesday 9/3/19.  She fell sideways and tried to break her fall with the left arm and subsequently landed onto the outstretched hand against some bleachers. She also partially landed on her back. She went to [facility] where xrays were taken.  She was told that she fractured her wrist.  She was placed in a splint.  The pain became severe that evening and she went to [facility] ED. New x-rays were performed and revealed a  minimally displaced fracture of the distal radial metaphysis with intra-articular extension of the fracture line. She was placed into a posterior splint and sling and advised to followup with a specialist. She states that her right long and ring fingers are tingling which was present in the left hand prior to the fall. The patient was placed into a short arm cast in this office on 09/05/2019. She returned to work on 09/09/2019. She believes that she may have gotten the cast wet since her last visit. She would like to have it changed. \par Today she c/o increased pain in the low back. She believes it is because of the way she injured it as she fell against the bleachers. \par There is new development of left great toe pain. She went to take a bath and struck the toe against the rim of the tub. She continues to have pain in the toe. She ices it as needed.

## 2019-10-04 NOTE — RETURN TO WORK/SCHOOL
[FreeTextEntry1] : To whom this may concern, \par \par Ms. AILIN ALLISON was seen in the office today on 10/03/2019 and seen by for an Orthopedic follow-up visit. She is currently being treated by for a LT wrist distal radius fracture with a short arm cast. The injury occurred on 09/03/2019. She returned to work on 09/09/2019. She will provide all relevant imaging studies. \par \par Sincerely, \par Gregory Fregoso MD

## 2019-10-04 NOTE — DISCUSSION/SUMMARY
[de-identified] : Left short arm cast was removed and a new one was applied.\par Comfortable footwear was recommended. \par NSAIDs as tolerated. \par Gentle ROM exercises in the cast were advised.\par She will return in 2 weeks for cast removal and repeat x-rays.

## 2019-10-04 NOTE — ADDENDUM
[FreeTextEntry1] : I, Orly Berkowitz wrote this note acting as a scribe for Dr. Gregory Fregoso on Oct 03, 2019.

## 2019-10-04 NOTE — PHYSICAL EXAM
[de-identified] : Patient is WDWN, alert, and in no acute distress. Breathing is unlabored. She is grossly oriented to person, place, and time. \par \par Left Wrist: Short arm cast was removed. There is tenderness dorsally with localized swelling to the hand and fingers. No visible deformities. The ROM is full in the shoulder and elbow. She is able to move the fingers. There is no joint instability on provocative testing. \par Strength: extension, flexion, ulnar deviation and radial deviation 5/5. \par \par Left Foot:\par Inspection/Palpation: Tenderness over the big toe proximal phalanx.\par Range of Motion: Full and painless in all planes in the ankle. \par Stability: Not assessed. \par Strength: Plantar flexion, dorsiflexion, inversion, eversion 5/5  [de-identified] : AP, lateral and oblique views of the left wrist in a cast were obtained today and revealed a minimally displaced fracture of the distal radial metaphysis with intra-articular extension of the fracture line. Fracture has not shifted in position. \par \par AP, lateral and oblique views of the left big toe were obtained today and revealed a nondisplaced fracture at the head of the left great toe proximal phalanx IP joint.

## 2019-10-12 LAB
25(OH)D3 SERPL-MCNC: 74.9 NG/ML
ALBUMIN SERPL ELPH-MCNC: 4.7 G/DL
ALP BLD-CCNC: 113 U/L
ALT SERPL-CCNC: 23 U/L
ANION GAP SERPL CALC-SCNC: 12 MMOL/L
AST SERPL-CCNC: 26 U/L
BILIRUB SERPL-MCNC: 0.2 MG/DL
BUN SERPL-MCNC: 16 MG/DL
CALCIUM SERPL-MCNC: 9.6 MG/DL
CHLORIDE SERPL-SCNC: 103 MMOL/L
CHOLEST SERPL-MCNC: 207 MG/DL
CHOLEST/HDLC SERPL: 4.7 RATIO
CO2 SERPL-SCNC: 27 MMOL/L
CREAT SERPL-MCNC: 0.65 MG/DL
FERRITIN SERPL-MCNC: 56 NG/ML
FOLATE SERPL-MCNC: >20 NG/ML
GLUCOSE SERPL-MCNC: 92 MG/DL
HDLC SERPL-MCNC: 44 MG/DL
LDLC SERPL CALC-MCNC: 130 MG/DL
POTASSIUM SERPL-SCNC: 5.1 MMOL/L
PROT SERPL-MCNC: 7.8 G/DL
SAVE SPECIMEN: NORMAL
SODIUM SERPL-SCNC: 142 MMOL/L
T3RU NFR SERPL: 1.1 TBI
T4 SERPL-MCNC: 5.8 UG/DL
TRIGL SERPL-MCNC: 167 MG/DL
TSH SERPL-ACNC: 2.12 UIU/ML
URATE SERPL-MCNC: 4.9 MG/DL
VIT B12 SERPL-MCNC: >2000 PG/ML

## 2019-10-13 NOTE — PHYSICAL EXAM
[Declined Breast Exam] : declined breast exam  [Normal] : soft, non-tender, non-distended, no masses palpated, no HSM and normal bowel sounds [Declined Rectal Exam] : declined rectal exam

## 2019-10-13 NOTE — REVIEW OF SYSTEMS
[Fatigue] : fatigue [Joint Pain] : joint pain [Joint Stiffness] : joint stiffness [Muscle Pain] : muscle pain [Back Pain] : back pain [Negative] : Neurological

## 2019-10-13 NOTE — HEALTH RISK ASSESSMENT
[] : No [No] : No [0] : 2) Feeling down, depressed, or hopeless: Not at all (0) [Audit-CScore] : 0 [[id number]] : 0 [Fully functional (bathing, dressing, toileting, transferring, walking, feeding)] : Fully functional (bathing, dressing, toileting, transferring, walking, feeding) [Fully functional (using the telephone, shopping, preparing meals, housekeeping, doing laundry, using] : Fully functional and needs no help or supervision to perform IADLs (using the telephone, shopping, preparing meals, housekeeping, doing laundry, using transportation, managing medications and managing finances) [Patient/Caregiver not ready to engage] : Patient/Caregiver not ready to engage [I will adhere to the patient's wishes as expressed in the advance directive except as noted below.] : I will adhere to the patient's wishes as expressed in the advance directive except as noted below [AdvancecareDate] : 10/4/19

## 2019-10-13 NOTE — PLAN
[FreeTextEntry1] : Problems\par Significant obesity- advised weight loss and exercise\par Need for colonoscopy\par Need for mammogram\par need for gyn exam\par Cardiology evaluation\par orthopedic evaluation

## 2019-10-13 NOTE — HISTORY OF PRESENT ILLNESS
[de-identified] : This is a 65 y/o woman who is here for her annual ohysical . She has a PMH of obesity  complicated by sleep apnea ,\par severe osteoarthritis ,dyspnea .She aso has a history of a previous small CVA .

## 2019-10-17 ENCOUNTER — APPOINTMENT (OUTPATIENT)
Dept: ORTHOPEDIC SURGERY | Facility: CLINIC | Age: 64
End: 2019-10-17

## 2019-10-21 ENCOUNTER — APPOINTMENT (OUTPATIENT)
Dept: ORTHOPEDIC SURGERY | Facility: CLINIC | Age: 64
End: 2019-10-21
Payer: COMMERCIAL

## 2019-10-21 PROCEDURE — 29125 APPL SHORT ARM SPLINT STATIC: CPT | Mod: LT

## 2019-10-21 PROCEDURE — 73110 X-RAY EXAM OF WRIST: CPT | Mod: LT

## 2019-10-21 PROCEDURE — 99213 OFFICE O/P EST LOW 20 MIN: CPT | Mod: 25

## 2019-10-22 NOTE — HISTORY OF PRESENT ILLNESS
[de-identified] : Pt is a 63 y/o RHD female  with left wrist injury.  She tripped and fell over someone's foot while participating in a class activity on Tuesday 9/3/19.  She fell sideways and tried to break her fall with the left arm and subsequently landed onto the outstretched hand against some bleachers. She also partially landed on her back. She went to [facility] where xrays were taken.  She was told that she fractured her wrist.  She was placed in a splint.  The pain became severe that evening and she went to [facility] ED. New x-rays were performed and revealed a  minimally displaced fracture of the distal radial metaphysis with intra-articular extension of the fracture line. She was placed into a posterior splint and sling and advised to followup with a specialist. She states that her right long and ring fingers are tingling which was present in the left hand prior to the fall. The patient was placed into a short arm cast in this office on 09/05/2019. She returned to work on 09/09/2019. She presents today for final cast removal and repeat x-rays. \par Today she c/o increased pain in the low back. She believes it is because of the way she injured it as she fell against the bleachers. \par There is new development of left great toe pain. She went to take a bath and struck the toe against the rim of the tub. She continues to have pain in the toe. She ices it as needed.

## 2019-10-22 NOTE — PHYSICAL EXAM
[de-identified] : Patient is WDWN, alert, and in no acute distress. Breathing is unlabored. She is grossly oriented to person, place, and time. \par \par Left Wrist: Short arm cast was removed. There is mild tenderness dorsally with localized swelling to the hand and fingers. No visible deformities. The ROM is full in the shoulder and elbow. She is able to move the fingers. There is no joint instability on provocative testing. \par Strength: extension, flexion, ulnar deviation and radial deviation 5/5. \par \par Left Foot:\par Inspection/Palpation: Tenderness over the big toe proximal phalanx.\par Range of Motion: Full and painless in all planes in the ankle. \par Stability: Not assessed. \par Strength: Plantar flexion, dorsiflexion, inversion, eversion 5/5  [de-identified] : AP, lateral and oblique views of the left wrist were obtained today and revealed a minimally displaced fracture of the distal radial metaphysis with intra-articular extension of the fracture line. Fracture is approximately 90 % healed and has not shifted in position.

## 2019-10-22 NOTE — ADDENDUM
[FreeTextEntry1] : I, Orly Berkowitz wrote this note acting as a scribe for Dr. Gregory Fregoso on Oct 21, 2019.

## 2019-10-22 NOTE — DISCUSSION/SUMMARY
[de-identified] : Left short arm cast was removed and she was placed into a short arm splint. I advised her to utilize a wrist brace for the interim. \par Gentle range of motion and strengthening exercises were encouraged, as tolerated by pain. \par NSAIDs as tolerated. \par A note for insurance was provided.\par She will return in 4 weeks for repeat x-rays.

## 2019-11-12 ENCOUNTER — RX RENEWAL (OUTPATIENT)
Age: 64
End: 2019-11-12

## 2019-11-13 ENCOUNTER — RX RENEWAL (OUTPATIENT)
Age: 64
End: 2019-11-13

## 2019-11-15 ENCOUNTER — MEDICATION RENEWAL (OUTPATIENT)
Age: 64
End: 2019-11-15

## 2019-11-21 ENCOUNTER — APPOINTMENT (OUTPATIENT)
Dept: ORTHOPEDIC SURGERY | Facility: CLINIC | Age: 64
End: 2019-11-21
Payer: COMMERCIAL

## 2019-11-21 DIAGNOSIS — S52.592D OTHER FRACTURES OF LOWER END OF LEFT RADIUS, SUBSEQUENT ENCOUNTER FOR CLOSED FRACTURE WITH ROUTINE HEALING: ICD-10-CM

## 2019-11-21 PROCEDURE — 73110 X-RAY EXAM OF WRIST: CPT | Mod: LT

## 2019-11-21 PROCEDURE — 99213 OFFICE O/P EST LOW 20 MIN: CPT

## 2019-11-22 NOTE — HISTORY OF PRESENT ILLNESS
[de-identified] : Pt is a 65 y/o RHD female  with left wrist injury.  She tripped and fell over someone's foot while participating in a class activity on Tuesday 9/3/19.  She fell sideways and tried to break her fall with the left arm and subsequently landed onto the outstretched hand against some bleachers. She also partially landed on her back. She went to [facility] where xrays were taken.  She was told that she fractured her wrist.  She was placed in a splint.  The pain became severe that evening and she went to [facility] ED. New x-rays were performed and revealed a  minimally displaced fracture of the distal radial metaphysis with intra-articular extension of the fracture line. She was placed into a posterior splint and sling and advised to followup with a specialist. She states that her right long and ring fingers are tingling which was present in the left hand prior to the fall. The patient was placed into a short arm cast in this office on 09/05/2019 which has since been removed. She returned to work on 09/09/2019. Today she states that she has been increasing daily use of the left wrist. Her range of motion is still limited but she is unable to attend PT because of time constraint. She wears the brace at sleep and while at work.

## 2019-11-22 NOTE — RETURN TO WORK/SCHOOL
[FreeTextEntry1] : Ms. AILIN ALLISON was seen in the office today on 11/21/2019 and evaluated by me for an Orthopedic followup visit regarding a left wrist injury. The patient has gone through three cast fittings on the dates 09/05/2019, 09/16/2019 and 10/03/2019. She will require no further cast treatment but she will follow up with outpatient physical therapy.\par \par Sincerely, \par Gregory Fregoso MD. \par

## 2019-11-22 NOTE — ADDENDUM
[FreeTextEntry1] : I, Orly Berkowitz wrote this note acting as a scribe for Dr. Gregory Fregoso on Nov 21, 2019.

## 2019-11-22 NOTE — PHYSICAL EXAM
[de-identified] : Patient is WDWN, alert, and in no acute distress. Breathing is unlabored. She is grossly oriented to person, place, and time. \par \par Left Wrist: There is mild tenderness dorsally with localized swelling to the hand and fingers. No visible deformities. The ROM is full in the shoulder and elbow. She is able to move the fingers. Wrist flexion and extension are still limited. There is no joint instability on provocative testing. \par Strength: extension, flexion, ulnar deviation and radial deviation 5/5. [de-identified] : AP, lateral and oblique views of the left wrist were obtained today and revealed a minimally displaced fracture of the distal radial metaphysis with intra-articular extension of the fracture line. Fracture is approximately 90 % healed and has not shifted in position.

## 2019-11-22 NOTE — DISCUSSION/SUMMARY
[de-identified] : The patient wishes to proceed with physical therapy. A script was given. \par Gentle range of motion and strengthening exercises were encouraged, as tolerated by pain. \par NSAIDs as tolerated. \par A note for insurance was provided.\par She will return in 4 weeks for repeat x-rays.

## 2019-12-26 NOTE — RETURN TO WORK/SCHOOL
Right LE/Left LE [FreeTextEntry1] : Ms. AILIN ALLISON was seen in the office today on 10/21/2019 and evaluated by me for an Orthopedic followup visit regarding a left wrist injury. The patient has gone through three cast fittings on the dates 09/05/2019, 09/16/2019 and 10/03/2019. She will require no further cast treatment but she will follow up with outpatient physical therapy.\par \par Sincerely, \karishma Fregoso MD

## 2020-02-02 ENCOUNTER — RX RENEWAL (OUTPATIENT)
Age: 65
End: 2020-02-02

## 2020-03-09 ENCOUNTER — APPOINTMENT (OUTPATIENT)
Dept: INTERNAL MEDICINE | Facility: CLINIC | Age: 65
End: 2020-03-09
Payer: COMMERCIAL

## 2020-03-09 ENCOUNTER — LABORATORY RESULT (OUTPATIENT)
Age: 65
End: 2020-03-09

## 2020-03-09 VITALS — HEIGHT: 64 IN | WEIGHT: 268 LBS | BODY MASS INDEX: 45.75 KG/M2

## 2020-03-09 VITALS — SYSTOLIC BLOOD PRESSURE: 130 MMHG | DIASTOLIC BLOOD PRESSURE: 70 MMHG

## 2020-03-09 DIAGNOSIS — N60.19 DIFFUSE CYSTIC MASTOPATHY OF UNSPECIFIED BREAST: ICD-10-CM

## 2020-03-09 DIAGNOSIS — G62.9 POLYNEUROPATHY, UNSPECIFIED: ICD-10-CM

## 2020-03-09 DIAGNOSIS — G56.03 CARPAL TUNNEL SYNDROM,BILATERAL UPPER LIMBS: ICD-10-CM

## 2020-03-09 LAB
BASOPHILS # BLD AUTO: 0.06 K/UL
BASOPHILS NFR BLD AUTO: 0.6 %
EOSINOPHIL # BLD AUTO: 0.24 K/UL
EOSINOPHIL NFR BLD AUTO: 2.3 %
HCT VFR BLD CALC: 44 %
HGB BLD-MCNC: 13.1 G/DL
IMM GRANULOCYTES NFR BLD AUTO: 0.2 %
LYMPHOCYTES # BLD AUTO: 2.91 K/UL
LYMPHOCYTES NFR BLD AUTO: 27.7 %
MAN DIFF?: NORMAL
MCHC RBC-ENTMCNC: 21.8 PG
MCHC RBC-ENTMCNC: 29.8 GM/DL
MCV RBC AUTO: 73.2 FL
MONOCYTES # BLD AUTO: 0.56 K/UL
MONOCYTES NFR BLD AUTO: 5.3 %
NEUTROPHILS # BLD AUTO: 6.73 K/UL
NEUTROPHILS NFR BLD AUTO: 63.9 %
PLATELET # BLD AUTO: 264 K/UL
RBC # BLD: 6.01 M/UL
RBC # FLD: 18.2 %
SAVE SPECIMEN: NORMAL
WBC # FLD AUTO: 10.52 K/UL

## 2020-03-09 PROCEDURE — 99213 OFFICE O/P EST LOW 20 MIN: CPT

## 2020-03-09 NOTE — ASSESSMENT
[FreeTextEntry1] : Her vitals on the stable the physical examination is unchanged. The patient again was advised to lose weight and join [employer]

## 2020-03-09 NOTE — HISTORY OF PRESENT ILLNESS
[de-identified] : This is a 64-year-old patient with a history of cerebral artery aneurysm, obstructive sleep apnea, elevated A1c, peripheral neuropathyOssea today for a followup visit

## 2020-03-17 DIAGNOSIS — D56.3 THALASSEMIA MINOR: ICD-10-CM

## 2020-03-17 LAB
25(OH)D3 SERPL-MCNC: 61.7 NG/ML
ALBUMIN SERPL ELPH-MCNC: 4.8 G/DL
ALP BLD-CCNC: 104 U/L
ALT SERPL-CCNC: 21 U/L
ANION GAP SERPL CALC-SCNC: 13 MMOL/L
APPEARANCE: ABNORMAL
AST SERPL-CCNC: 26 U/L
BACTERIA: ABNORMAL
BILIRUB SERPL-MCNC: 0.3 MG/DL
BILIRUBIN URINE: NEGATIVE
BLOOD URINE: NEGATIVE
BUN SERPL-MCNC: 9 MG/DL
CALCIUM OXALATE CRYSTALS: ABNORMAL
CALCIUM SERPL-MCNC: 10.2 MG/DL
CHLORIDE SERPL-SCNC: 101 MMOL/L
CHOLEST SERPL-MCNC: 228 MG/DL
CHOLEST/HDLC SERPL: 4.6 RATIO
CO2 SERPL-SCNC: 29 MMOL/L
COLOR: ABNORMAL
CREAT SERPL-MCNC: 0.77 MG/DL
ESTIMATED AVERAGE GLUCOSE: 131 MG/DL
FERRITIN SERPL-MCNC: 49 NG/ML
FOLATE SERPL-MCNC: >20 NG/ML
GLUCOSE QUALITATIVE U: NEGATIVE
GLUCOSE SERPL-MCNC: 134 MG/DL
HBA1C MFR BLD HPLC: 6.2 %
HDLC SERPL-MCNC: 49 MG/DL
HYALINE CASTS: 0 /LPF
KETONES URINE: NORMAL
LDLC SERPL CALC-MCNC: 139 MG/DL
LEUKOCYTE ESTERASE URINE: NEGATIVE
MICROSCOPIC-UA: NORMAL
NITRITE URINE: NEGATIVE
PH URINE: 5.5
POTASSIUM SERPL-SCNC: 4.4 MMOL/L
PROT SERPL-MCNC: 7.6 G/DL
PROTEIN URINE: ABNORMAL
RED BLOOD CELLS URINE: 5 /HPF
SODIUM SERPL-SCNC: 143 MMOL/L
SPECIFIC GRAVITY URINE: 1.04
SQUAMOUS EPITHELIAL CELLS: 4 /HPF
T3RU NFR SERPL: 1.2 TBI
T4 SERPL-MCNC: 6.5 UG/DL
TRIGL SERPL-MCNC: 198 MG/DL
TSH SERPL-ACNC: 2.52 UIU/ML
URATE SERPL-MCNC: 4.6 MG/DL
UROBILINOGEN URINE: ABNORMAL
VIT B12 SERPL-MCNC: >2000 PG/ML
WHITE BLOOD CELLS URINE: 3 /HPF

## 2020-06-29 ENCOUNTER — LABORATORY RESULT (OUTPATIENT)
Age: 65
End: 2020-06-29

## 2020-06-29 ENCOUNTER — APPOINTMENT (OUTPATIENT)
Dept: INTERNAL MEDICINE | Facility: CLINIC | Age: 65
End: 2020-06-29
Payer: COMMERCIAL

## 2020-06-29 VITALS — HEIGHT: 64 IN

## 2020-06-29 VITALS — TEMPERATURE: 98.7 F

## 2020-06-29 PROCEDURE — 36415 COLL VENOUS BLD VENIPUNCTURE: CPT

## 2020-06-29 PROCEDURE — 99214 OFFICE O/P EST MOD 30 MIN: CPT | Mod: 25

## 2020-06-29 RX ORDER — FUROSEMIDE 20 MG/1
20 TABLET ORAL
Qty: 90 | Refills: 2 | Status: ACTIVE | COMMUNITY
Start: 2019-06-27

## 2020-06-29 NOTE — REVIEW OF SYSTEMS
[Shortness Of Breath] : shortness of breath [Fatigue] : fatigue [Dyspnea on Exertion] : dyspnea on exertion [Negative] : Neurological

## 2020-06-29 NOTE — HISTORY OF PRESENT ILLNESS
[de-identified] : This is a 64-year-old patient with a history of cerebral aneurysm status post CVA who also has a history of hypertension, hypercholesterolemia, obstructive sleep apneaAnd obesity

## 2020-06-30 LAB
25(OH)D3 SERPL-MCNC: 59.4 NG/ML
ALBUMIN SERPL ELPH-MCNC: 4.8 G/DL
ALP BLD-CCNC: 114 U/L
ALT SERPL-CCNC: 25 U/L
ANION GAP SERPL CALC-SCNC: 14 MMOL/L
APPEARANCE: CLEAR
AST SERPL-CCNC: 28 U/L
BACTERIA: NEGATIVE
BASOPHILS # BLD AUTO: 0.07 K/UL
BASOPHILS NFR BLD AUTO: 0.6 %
BILIRUB SERPL-MCNC: 0.2 MG/DL
BILIRUBIN URINE: NEGATIVE
BLOOD URINE: NEGATIVE
BUN SERPL-MCNC: 9 MG/DL
CALCIUM SERPL-MCNC: 9.7 MG/DL
CHLORIDE SERPL-SCNC: 102 MMOL/L
CHOLEST SERPL-MCNC: 205 MG/DL
CHOLEST/HDLC SERPL: 4.3 RATIO
CO2 SERPL-SCNC: 25 MMOL/L
COLOR: YELLOW
CREAT SERPL-MCNC: 0.85 MG/DL
EOSINOPHIL # BLD AUTO: 0.3 K/UL
EOSINOPHIL NFR BLD AUTO: 2.8 %
ESTIMATED AVERAGE GLUCOSE: 134 MG/DL
FERRITIN SERPL-MCNC: 65 NG/ML
FOLATE SERPL-MCNC: >20 NG/ML
GLUCOSE QUALITATIVE U: NEGATIVE
GLUCOSE SERPL-MCNC: 92 MG/DL
HBA1C MFR BLD HPLC: 6.3 %
HCT VFR BLD CALC: 45.3 %
HDLC SERPL-MCNC: 47 MG/DL
HGB BLD-MCNC: 13.4 G/DL
HYALINE CASTS: 0 /LPF
IMM GRANULOCYTES NFR BLD AUTO: 0.5 %
KETONES URINE: NEGATIVE
LDLC SERPL CALC-MCNC: 112 MG/DL
LEUKOCYTE ESTERASE URINE: NEGATIVE
LYMPHOCYTES # BLD AUTO: 3.15 K/UL
LYMPHOCYTES NFR BLD AUTO: 29.1 %
MAN DIFF?: NORMAL
MCHC RBC-ENTMCNC: 21.6 PG
MCHC RBC-ENTMCNC: 29.6 GM/DL
MCV RBC AUTO: 72.9 FL
MICROSCOPIC-UA: NORMAL
MONOCYTES # BLD AUTO: 0.83 K/UL
MONOCYTES NFR BLD AUTO: 7.7 %
NEUTROPHILS # BLD AUTO: 6.44 K/UL
NEUTROPHILS NFR BLD AUTO: 59.3 %
NITRITE URINE: NEGATIVE
PH URINE: 7.5
PLATELET # BLD AUTO: 286 K/UL
POTASSIUM SERPL-SCNC: 4.6 MMOL/L
PROT SERPL-MCNC: 7.7 G/DL
PROTEIN URINE: ABNORMAL
RBC # BLD: 6.21 M/UL
RBC # FLD: 19.5 %
RED BLOOD CELLS URINE: 6 /HPF
SODIUM SERPL-SCNC: 141 MMOL/L
SPECIFIC GRAVITY URINE: 1.02
SQUAMOUS EPITHELIAL CELLS: 8 /HPF
T3RU NFR SERPL: 1.2 TBI
T4 SERPL-MCNC: 6.5 UG/DL
TRIGL SERPL-MCNC: 230 MG/DL
TSH SERPL-ACNC: 4.26 UIU/ML
URATE SERPL-MCNC: 5.3 MG/DL
UROBILINOGEN URINE: NORMAL
VIT B12 SERPL-MCNC: >2000 PG/ML
WBC # FLD AUTO: 10.84 K/UL
WHITE BLOOD CELLS URINE: 6 /HPF

## 2020-12-16 NOTE — END OF VISIT
[FreeTextEntry3] : I, Gregory Fregoso MD, ordering physician, have read and attest that all the information, medical decision making and discharge instructions within are true and accurate. 
BiPAP/CPAP

## 2021-01-07 ENCOUNTER — NON-APPOINTMENT (OUTPATIENT)
Age: 66
End: 2021-01-07

## 2021-01-08 ENCOUNTER — APPOINTMENT (OUTPATIENT)
Dept: INTERNAL MEDICINE | Facility: CLINIC | Age: 66
End: 2021-01-08
Payer: COMMERCIAL

## 2021-01-08 VITALS — SYSTOLIC BLOOD PRESSURE: 140 MMHG | DIASTOLIC BLOOD PRESSURE: 95 MMHG

## 2021-01-08 DIAGNOSIS — M17.12 UNILATERAL PRIMARY OSTEOARTHRITIS, LEFT KNEE: ICD-10-CM

## 2021-01-08 PROCEDURE — 99072 ADDL SUPL MATRL&STAF TM PHE: CPT

## 2021-01-08 PROCEDURE — 99214 OFFICE O/P EST MOD 30 MIN: CPT

## 2021-01-08 RX ORDER — EZETIMIBE 10 MG/1
10 TABLET ORAL
Qty: 90 | Refills: 1 | Status: ACTIVE | COMMUNITY
Start: 2020-03-17

## 2021-01-08 NOTE — HISTORY OF PRESENT ILLNESS
[de-identified] : This is a 65-year-old patient with a history of hypertension, hypercholesterolemia, obstructive sleep apnea and significant obesity who stopped her losartan because of lack of medication insurance and was told to have a blood pressure of 220/130 and is here today for evaluation of noticed that the patient resumed her losartan

## 2021-01-08 NOTE — ASSESSMENT
[FreeTextEntry1] : Problems\par Obesity\par Hypertension\par Hypercholesterolemia\par Obstructive sleep apnea\par This is a patient with significant medical issues who stopped her losartan hydrochlorothiazide because she lost her medication insurance.  Her blood pressure searched to 210/100.  I told her that it is mandatory to resume her medications.  And to come in today for a recheck..  Presently her blood pressures 140/92 140/95.  Physical examination is positive for obesity.  I spoke to her about the risks including heart attack or stroke of stopping her medications.  I also spoke to her about the importance of weight loss

## 2021-01-20 RX ORDER — ALBUTEROL SULFATE 2.5 MG/3ML
(2.5 MG/3ML) SOLUTION RESPIRATORY (INHALATION)
Qty: 3 | Refills: 3 | Status: ACTIVE | COMMUNITY
Start: 2019-11-15 | End: 1900-01-01

## 2021-02-08 ENCOUNTER — LABORATORY RESULT (OUTPATIENT)
Age: 66
End: 2021-02-08

## 2021-02-08 ENCOUNTER — NON-APPOINTMENT (OUTPATIENT)
Age: 66
End: 2021-02-08

## 2021-02-08 ENCOUNTER — APPOINTMENT (OUTPATIENT)
Dept: INTERNAL MEDICINE | Facility: CLINIC | Age: 66
End: 2021-02-08
Payer: COMMERCIAL

## 2021-02-08 VITALS — HEIGHT: 63 IN | TEMPERATURE: 97.2 F | WEIGHT: 272 LBS | BODY MASS INDEX: 48.2 KG/M2

## 2021-02-08 DIAGNOSIS — Z87.898 PERSONAL HISTORY OF OTHER SPECIFIED CONDITIONS: ICD-10-CM

## 2021-02-08 DIAGNOSIS — Z00.00 ENCOUNTER FOR GENERAL ADULT MEDICAL EXAMINATION W/OUT ABNORMAL FINDINGS: ICD-10-CM

## 2021-02-08 DIAGNOSIS — R06.02 SHORTNESS OF BREATH: ICD-10-CM

## 2021-02-08 DIAGNOSIS — Z86.73 PERSONAL HISTORY OF TRANSIENT ISCHEMIC ATTACK (TIA), AND CEREBRAL INFARCTION W/OUT RESIDUAL DEFICITS: ICD-10-CM

## 2021-02-08 DIAGNOSIS — J45.909 UNSPECIFIED ASTHMA, UNCOMPLICATED: ICD-10-CM

## 2021-02-08 DIAGNOSIS — S06.9X9A UNSPECIFIED INTRACRANIAL INJURY WITH LOSS OF CONSCIOUSNESS OF UNSPECIFIED DURATION, INITIAL ENCOUNTER: ICD-10-CM

## 2021-02-08 LAB
BASOPHILS # BLD AUTO: 0.04 K/UL
BASOPHILS NFR BLD AUTO: 0.4 %
EOSINOPHIL # BLD AUTO: 0.2 K/UL
EOSINOPHIL NFR BLD AUTO: 2.1 %
HCT VFR BLD CALC: 45.2 %
HGB BLD-MCNC: 13 G/DL
IMM GRANULOCYTES NFR BLD AUTO: 0.4 %
LYMPHOCYTES # BLD AUTO: 2.56 K/UL
LYMPHOCYTES NFR BLD AUTO: 27.1 %
MAN DIFF?: NORMAL
MCHC RBC-ENTMCNC: 21.1 PG
MCHC RBC-ENTMCNC: 28.8 GM/DL
MCV RBC AUTO: 73.4 FL
MONOCYTES # BLD AUTO: 0.52 K/UL
MONOCYTES NFR BLD AUTO: 5.5 %
NEUTROPHILS # BLD AUTO: 6.07 K/UL
NEUTROPHILS NFR BLD AUTO: 64.5 %
PLATELET # BLD AUTO: 287 K/UL
RBC # BLD: 6.16 M/UL
RBC # FLD: 18.5 %
RHEUMATOID FACT SER QL: 10 IU/ML
WBC # FLD AUTO: 9.43 K/UL

## 2021-02-08 PROCEDURE — 93000 ELECTROCARDIOGRAM COMPLETE: CPT

## 2021-02-08 PROCEDURE — 99072 ADDL SUPL MATRL&STAF TM PHE: CPT

## 2021-02-08 PROCEDURE — 99396 PREV VISIT EST AGE 40-64: CPT | Mod: 25

## 2021-02-08 NOTE — HISTORY OF PRESENT ILLNESS
[de-identified] : This is a 65-year-old patient with a history of cerebral artery aneurysm, hypercholesterolemia, hypertension, obstructive sleep apnea who is here today for her annual well examination.

## 2021-02-08 NOTE — HEALTH RISK ASSESSMENT
[] : No [No] : No [No falls in past year] : Patient reported no falls in the past year [0] : 2) Feeling down, depressed, or hopeless: Not at all (0) [[id number]] : 0 [Fully functional (bathing, dressing, toileting, transferring, walking, feeding)] : Fully functional (bathing, dressing, toileting, transferring, walking, feeding) [Fully functional (using the telephone, shopping, preparing meals, housekeeping, doing laundry, using] : Fully functional and needs no help or supervision to perform IADLs (using the telephone, shopping, preparing meals, housekeeping, doing laundry, using transportation, managing medications and managing finances) [Reviewed no changes] : Reviewed no changes [I will adhere to the patient's wishes as expressed in the advance directive except as noted below.] : I will adhere to the patient's wishes as expressed in the advance directive except as noted below [AdvancecareDate] : 2/8/21

## 2021-02-08 NOTE — ASSESSMENT
[FreeTextEntry1] : Problems\par Cerebral artery aneurysm\par The patient presently is asymptomatic double vision or any other neurologic she denies any headaches findings.\par I requested a CT angiogram of the brain to assess the patient's aneurysm\par Hypercholesterolemia\par Hypertension\par I spoke to the patient about the importance of weight loss and diet in controlling these 2 issues.  The patient presently is significantly overweight.  I suggest that she join weight watchers and will to join an exercise program\par Obstructive sleep apnea she was referred to pulmonary for further evaluation

## 2021-02-08 NOTE — PHYSICAL EXAM
[Declined Rectal Exam] : declined rectal exam [Normal] : normal gait, coordination grossly intact, no focal deficits and deep tendon reflexes were 2+ and symmetric

## 2021-02-17 DIAGNOSIS — R76.8 OTHER SPECIFIED ABNORMAL IMMUNOLOGICAL FINDINGS IN SERUM: ICD-10-CM

## 2021-02-17 LAB
25(OH)D3 SERPL-MCNC: 52.8 NG/ML
ALBUMIN SERPL ELPH-MCNC: 4.6 G/DL
ALP BLD-CCNC: 116 U/L
ALT SERPL-CCNC: 18 U/L
ANA PAT FLD IF-IMP: ABNORMAL
ANA SER IF-ACNC: ABNORMAL
ANION GAP SERPL CALC-SCNC: 13 MMOL/L
APPEARANCE: CLEAR
AST SERPL-CCNC: 18 U/L
B BURGDOR AB SER-IMP: NEGATIVE
B BURGDOR IGM PATRN SER IB-IMP: NEGATIVE
B BURGDOR18KD IGG SER QL IB: NORMAL
B BURGDOR23KD IGG SER QL IB: NORMAL
B BURGDOR23KD IGM SER QL IB: NORMAL
B BURGDOR28KD IGG SER QL IB: NORMAL
B BURGDOR30KD IGG SER QL IB: NORMAL
B BURGDOR31KD IGG SER QL IB: NORMAL
B BURGDOR39KD IGG SER QL IB: NORMAL
B BURGDOR39KD IGM SER QL IB: NORMAL
B BURGDOR41KD IGG SER QL IB: PRESENT
B BURGDOR41KD IGM SER QL IB: PRESENT
B BURGDOR45KD IGG SER QL IB: NORMAL
B BURGDOR58KD IGG SER QL IB: NORMAL
B BURGDOR66KD IGG SER QL IB: PRESENT
B BURGDOR93KD IGG SER QL IB: NORMAL
BACTERIA: ABNORMAL
BILIRUB SERPL-MCNC: 0.2 MG/DL
BILIRUBIN URINE: NEGATIVE
BLOOD URINE: NEGATIVE
BUN SERPL-MCNC: 12 MG/DL
CALCIUM SERPL-MCNC: 9.6 MG/DL
CHLORIDE SERPL-SCNC: 104 MMOL/L
CHOLEST SERPL-MCNC: 226 MG/DL
CO2 SERPL-SCNC: 27 MMOL/L
COLOR: YELLOW
CREAT SERPL-MCNC: 0.8 MG/DL
CRP SERPL HS-MCNC: 6.54 MG/L
ERYTHROCYTE [SEDIMENTATION RATE] IN BLOOD BY WESTERGREN METHOD: 54 MM/HR
ESTIMATED AVERAGE GLUCOSE: 140 MG/DL
FERRITIN SERPL-MCNC: 63 NG/ML
FOLATE SERPL-MCNC: 15 NG/ML
GLUCOSE QUALITATIVE U: NEGATIVE
GLUCOSE SERPL-MCNC: 105 MG/DL
HBA1C MFR BLD HPLC: 6.5 %
HDLC SERPL-MCNC: 49 MG/DL
HYALINE CASTS: 5 /LPF
KETONES URINE: NEGATIVE
LDLC SERPL CALC-MCNC: 143 MG/DL
LEUKOCYTE ESTERASE URINE: NEGATIVE
MICROSCOPIC-UA: NORMAL
NITRITE URINE: NEGATIVE
NONHDLC SERPL-MCNC: 177 MG/DL
PH URINE: 6
POTASSIUM SERPL-SCNC: 5 MMOL/L
PROT SERPL-MCNC: 7.4 G/DL
PROTEIN URINE: NORMAL
RED BLOOD CELLS URINE: 3 /HPF
SODIUM SERPL-SCNC: 145 MMOL/L
SPECIFIC GRAVITY URINE: 1.03
SQUAMOUS EPITHELIAL CELLS: 2 /HPF
T3RU NFR SERPL: 1.3 TBI
T4 SERPL-MCNC: 6.1 UG/DL
TRIGL SERPL-MCNC: 169 MG/DL
TSH SERPL-ACNC: 2.91 UIU/ML
URATE SERPL-MCNC: 5.6 MG/DL
UROBILINOGEN URINE: NORMAL
VIT B12 SERPL-MCNC: 1054 PG/ML
WHITE BLOOD CELLS URINE: 2 /HPF

## 2021-02-18 ENCOUNTER — APPOINTMENT (OUTPATIENT)
Dept: INTERNAL MEDICINE | Facility: CLINIC | Age: 66
End: 2021-02-18
Payer: COMMERCIAL

## 2021-02-18 ENCOUNTER — APPOINTMENT (OUTPATIENT)
Dept: CT IMAGING | Facility: IMAGING CENTER | Age: 66
End: 2021-02-18
Payer: COMMERCIAL

## 2021-02-18 PROCEDURE — 77080 DXA BONE DENSITY AXIAL: CPT

## 2021-02-18 PROCEDURE — 99072 ADDL SUPL MATRL&STAF TM PHE: CPT

## 2021-02-19 ENCOUNTER — APPOINTMENT (OUTPATIENT)
Dept: CT IMAGING | Facility: IMAGING CENTER | Age: 66
End: 2021-02-19
Payer: COMMERCIAL

## 2021-02-19 ENCOUNTER — OUTPATIENT (OUTPATIENT)
Dept: OUTPATIENT SERVICES | Facility: HOSPITAL | Age: 66
LOS: 1 days | End: 2021-02-19
Payer: COMMERCIAL

## 2021-02-19 DIAGNOSIS — I67.1 CEREBRAL ANEURYSM, NONRUPTURED: ICD-10-CM

## 2021-02-19 DIAGNOSIS — Z00.8 ENCOUNTER FOR OTHER GENERAL EXAMINATION: ICD-10-CM

## 2021-02-19 PROCEDURE — 70496 CT ANGIOGRAPHY HEAD: CPT

## 2021-02-19 PROCEDURE — 70496 CT ANGIOGRAPHY HEAD: CPT | Mod: 26

## 2021-06-21 ENCOUNTER — LABORATORY RESULT (OUTPATIENT)
Age: 66
End: 2021-06-21

## 2021-06-21 ENCOUNTER — APPOINTMENT (OUTPATIENT)
Dept: INTERNAL MEDICINE | Facility: CLINIC | Age: 66
End: 2021-06-21
Payer: COMMERCIAL

## 2021-06-21 VITALS — WEIGHT: 267 LBS | BODY MASS INDEX: 47.31 KG/M2 | HEIGHT: 63 IN | TEMPERATURE: 97.2 F

## 2021-06-21 VITALS — DIASTOLIC BLOOD PRESSURE: 80 MMHG | SYSTOLIC BLOOD PRESSURE: 130 MMHG

## 2021-06-21 DIAGNOSIS — R73.09 OTHER ABNORMAL GLUCOSE: ICD-10-CM

## 2021-06-21 DIAGNOSIS — I67.1 CEREBRAL ANEURYSM, NONRUPTURED: ICD-10-CM

## 2021-06-21 DIAGNOSIS — R60.0 LOCALIZED EDEMA: ICD-10-CM

## 2021-06-21 DIAGNOSIS — E78.00 PURE HYPERCHOLESTEROLEMIA, UNSPECIFIED: ICD-10-CM

## 2021-06-21 DIAGNOSIS — G47.33 OBSTRUCTIVE SLEEP APNEA (ADULT) (PEDIATRIC): ICD-10-CM

## 2021-06-21 PROCEDURE — 99214 OFFICE O/P EST MOD 30 MIN: CPT | Mod: 25

## 2021-06-21 NOTE — ASSESSMENT
[FreeTextEntry1] : Problems\par Cerebral artery aneurysm\par The patient presently is asymptomatic double vision or any other neurologic she denies any headaches findings.\par I requested a CT angiogram of the brain to assess the patient's aneurysm\par Hypercholesterolemia\par Hypertension\par I spoke to the patient about the importance of weight loss and diet in controlling these 2 issues.  The patient presently is significantly overweight.  I suggest that she join weight watchers and will to join an exercise program\par Obstructive sleep apnea she was referred to pulmonary for further evaluation\par 6/21/21\par This is a 65-year-old patient with a history of obesity, who is here today for follow-up visit.\par 1-cerebral artery aneurysm-the patient recently had a CT angiogram of the head which fortunately revealed that the aneurysm is unchanged.  The patient denies any headache or any neurological deficits.  She was referred to neurology for further evaluation.\par 2-dczopqzuzyletwapkdif-B spoke to the patient again about the importance of weight loss exercise and diet bloods were obtained today to assess her cholesterol .  She presently is on Zetia 10 mg daily.  If her cholesterol specifically LDLs are not at goal my plan is to add a statin to her present medications.\par 3-hypertension-the patient presently is on amlodipine 5 mg daily and also on losartan 100 mg / 12-1/2 mg.

## 2021-06-21 NOTE — HISTORY OF PRESENT ILLNESS
[de-identified] : This is a 65-year-old patient with a history of a cerebral artery aneurysm, elevated A1c, hypertension, hypercholesterolemia who is here today for follow-up visit

## 2021-06-25 LAB
25(OH)D3 SERPL-MCNC: 52.5 NG/ML
ALBUMIN SERPL ELPH-MCNC: 4.9 G/DL
ALP BLD-CCNC: 110 U/L
ALT SERPL-CCNC: 17 U/L
ANION GAP SERPL CALC-SCNC: 13 MMOL/L
AST SERPL-CCNC: 21 U/L
BASOPHILS # BLD AUTO: 0.05 K/UL
BASOPHILS NFR BLD AUTO: 0.5 %
BILIRUB SERPL-MCNC: 0.3 MG/DL
BUN SERPL-MCNC: 13 MG/DL
CALCIUM SERPL-MCNC: 10.1 MG/DL
CHLORIDE SERPL-SCNC: 104 MMOL/L
CHOLEST SERPL-MCNC: 236 MG/DL
CO2 SERPL-SCNC: 26 MMOL/L
CREAT SERPL-MCNC: 0.75 MG/DL
EOSINOPHIL # BLD AUTO: 0.18 K/UL
EOSINOPHIL NFR BLD AUTO: 1.7 %
ESTIMATED AVERAGE GLUCOSE: 128 MG/DL
FERRITIN SERPL-MCNC: 61 NG/ML
FOLATE SERPL-MCNC: >20 NG/ML
GLUCOSE SERPL-MCNC: 97 MG/DL
HBA1C MFR BLD HPLC: 6.1 %
HCT VFR BLD CALC: 46.4 %
HDLC SERPL-MCNC: 42 MG/DL
HGB BLD-MCNC: 13.2 G/DL
IMM GRANULOCYTES NFR BLD AUTO: 0.3 %
LDLC SERPL CALC-MCNC: 138 MG/DL
LYMPHOCYTES # BLD AUTO: 3.6 K/UL
LYMPHOCYTES NFR BLD AUTO: 33.7 %
MAN DIFF?: NORMAL
MCHC RBC-ENTMCNC: 21.6 PG
MCHC RBC-ENTMCNC: 28.4 GM/DL
MCV RBC AUTO: 75.8 FL
MONOCYTES # BLD AUTO: 0.79 K/UL
MONOCYTES NFR BLD AUTO: 7.4 %
NEUTROPHILS # BLD AUTO: 6.03 K/UL
NEUTROPHILS NFR BLD AUTO: 56.4 %
NONHDLC SERPL-MCNC: 194 MG/DL
PLATELET # BLD AUTO: 301 K/UL
POTASSIUM SERPL-SCNC: 5.2 MMOL/L
PROT SERPL-MCNC: 8 G/DL
RBC # BLD: 6.12 M/UL
RBC # FLD: 19.1 %
SODIUM SERPL-SCNC: 143 MMOL/L
T3RU NFR SERPL: 1.2 TBI
T4 SERPL-MCNC: 6.6 UG/DL
TRIGL SERPL-MCNC: 281 MG/DL
TSH SERPL-ACNC: 2.91 UIU/ML
URATE SERPL-MCNC: 5.3 MG/DL
VIT B12 SERPL-MCNC: 1190 PG/ML
WBC # FLD AUTO: 10.68 K/UL

## 2021-07-18 ENCOUNTER — RX RENEWAL (OUTPATIENT)
Age: 66
End: 2021-07-18

## 2021-08-31 ENCOUNTER — APPOINTMENT (OUTPATIENT)
Dept: INTERNAL MEDICINE | Facility: CLINIC | Age: 66
End: 2021-08-31
Payer: COMMERCIAL

## 2021-08-31 VITALS — SYSTOLIC BLOOD PRESSURE: 130 MMHG | DIASTOLIC BLOOD PRESSURE: 80 MMHG

## 2021-08-31 VITALS — WEIGHT: 265 LBS | HEIGHT: 64 IN | BODY MASS INDEX: 45.24 KG/M2

## 2021-08-31 DIAGNOSIS — E66.9 OBESITY, UNSPECIFIED: ICD-10-CM

## 2021-08-31 DIAGNOSIS — F41.9 ANXIETY DISORDER, UNSPECIFIED: ICD-10-CM

## 2021-08-31 DIAGNOSIS — I10 ESSENTIAL (PRIMARY) HYPERTENSION: ICD-10-CM

## 2021-08-31 PROCEDURE — 99212 OFFICE O/P EST SF 10 MIN: CPT

## 2021-08-31 NOTE — ASSESSMENT
[FreeTextEntry1] : The patient's blood pressure was normal.  I reassured the patient that her blood pressure was normal and what she is feeling is just her anxiety

## 2021-08-31 NOTE — HISTORY OF PRESENT ILLNESS
[de-identified] : This is a patient with a history of hypertension, obesity, anxiety she is here to have her blood pressure checked she is under a great anxiety she is moving out of [place]

## 2021-09-02 LAB
ALBUMIN SERPL ELPH-MCNC: 4.5 G/DL
ALP BLD-CCNC: 107 U/L
ALT SERPL-CCNC: 14 U/L
ANION GAP SERPL CALC-SCNC: 11 MMOL/L
AST SERPL-CCNC: 20 U/L
BASOPHILS # BLD AUTO: 0.07 K/UL
BASOPHILS NFR BLD AUTO: 0.7 %
BILIRUB SERPL-MCNC: 0.3 MG/DL
BUN SERPL-MCNC: 11 MG/DL
CALCIUM SERPL-MCNC: 9.5 MG/DL
CHLORIDE SERPL-SCNC: 104 MMOL/L
CO2 SERPL-SCNC: 26 MMOL/L
CREAT SERPL-MCNC: 0.68 MG/DL
EOSINOPHIL # BLD AUTO: 0.22 K/UL
EOSINOPHIL NFR BLD AUTO: 2.1 %
GLUCOSE SERPL-MCNC: 108 MG/DL
HCT VFR BLD CALC: 43.4 %
HGB BLD-MCNC: 12.9 G/DL
IMM GRANULOCYTES NFR BLD AUTO: 0.4 %
LEAD BLD-MCNC: <1 UG/DL
LYMPHOCYTES # BLD AUTO: 3.07 K/UL
LYMPHOCYTES NFR BLD AUTO: 29.4 %
MAN DIFF?: NORMAL
MCHC RBC-ENTMCNC: 21.6 PG
MCHC RBC-ENTMCNC: 29.7 GM/DL
MCV RBC AUTO: 72.7 FL
MONOCYTES # BLD AUTO: 0.69 K/UL
MONOCYTES NFR BLD AUTO: 6.6 %
NEUTROPHILS # BLD AUTO: 6.36 K/UL
NEUTROPHILS NFR BLD AUTO: 60.8 %
PLATELET # BLD AUTO: 274 K/UL
POTASSIUM SERPL-SCNC: 4.5 MMOL/L
PROT SERPL-MCNC: 7.4 G/DL
RBC # BLD: 5.97 M/UL
RBC # FLD: 18.8 %
SODIUM SERPL-SCNC: 141 MMOL/L
WBC # FLD AUTO: 10.45 K/UL

## 2021-09-03 LAB — MERCURY BLD-MCNC: 1.1 UG/L

## 2021-12-27 ENCOUNTER — RX RENEWAL (OUTPATIENT)
Age: 66
End: 2021-12-27

## 2022-02-16 ENCOUNTER — RX RENEWAL (OUTPATIENT)
Age: 67
End: 2022-02-16

## 2022-02-16 RX ORDER — GABAPENTIN 100 MG/1
100 CAPSULE ORAL
Qty: 90 | Refills: 3 | Status: ACTIVE | COMMUNITY
Start: 2020-02-03 | End: 1900-01-01

## 2022-04-08 ENCOUNTER — RX RENEWAL (OUTPATIENT)
Age: 67
End: 2022-04-08

## 2022-04-08 RX ORDER — GABAPENTIN 300 MG/1
300 CAPSULE ORAL TWICE DAILY
Qty: 360 | Refills: 3 | Status: ACTIVE | COMMUNITY
Start: 2018-11-07 | End: 1900-01-01

## 2022-07-11 ENCOUNTER — RX RENEWAL (OUTPATIENT)
Age: 67
End: 2022-07-11

## 2022-07-14 RX ORDER — ZOLPIDEM TARTRATE 12.5 MG/1
12.5 TABLET, EXTENDED RELEASE ORAL AT BEDTIME
Qty: 30 | Refills: 3 | Status: ACTIVE | COMMUNITY
Start: 2022-07-14 | End: 1900-01-01

## 2022-12-21 ENCOUNTER — RX RENEWAL (OUTPATIENT)
Age: 67
End: 2022-12-21

## 2023-01-24 ENCOUNTER — RX RENEWAL (OUTPATIENT)
Age: 68
End: 2023-01-24

## 2023-07-25 NOTE — PATIENT PROFILE ADULT. - NS PRO CONTRA FLU CONTRAIND
Follow-up with your orthopedic doctor from [facility] to get an appointment in the early next week for further evaluation of her symptoms as they are progressively worsening. Take your medications as prescribed. Do not drive or operate machinery while on pain medications. Return if you develop any new or worsening symptoms. Patient/surrogate refused vaccine

## 2023-12-18 ENCOUNTER — RX RENEWAL (OUTPATIENT)
Age: 68
End: 2023-12-18

## 2024-01-22 ENCOUNTER — RX RENEWAL (OUTPATIENT)
Age: 69
End: 2024-01-22

## 2024-08-06 NOTE — PROGRESS NOTE ADULT - PROVIDER SPECIALTY LIST ADULT
"Subjective   Patient ID: Sheron Meyers is a 77 y.o. female who presents for Diabetes (PT IS HERE FOR A DIABETIC CHECK).    HERE FOR DM CHECK.SON BOUGHT A HOUSE IN [facility] SAW DR RAYA THIS SPRING    Diabetes  She presents for her follow-up diabetic visit. She has type 2 diabetes mellitus. Her disease course has been improving. Risk factors for coronary artery disease include diabetes mellitus, dyslipidemia, obesity and post-menopausal. She is compliant with treatment all of the time. She is following a generally healthy diet. She has not had a previous visit with a dietitian. She participates in exercise intermittently. An ACE inhibitor/angiotensin II receptor blocker is being taken. She does not see a podiatrist.Eye exam is current.        Review of Systems   Neurological:  Positive for numbness.        LEFT THIGH OCCASIONAL  DOES NOT EFFECT DAILY ACTIVITIES       Objective   /68   Pulse 65   Temp 36.5 °C (97.7 °F)   Resp 18   Ht 1.588 m (5' 2.5\")   Wt 77.2 kg (170 lb 3.2 oz)   LMP  (LMP Unknown)   SpO2 97%   BMI 30.63 kg/m²     Physical Exam  Constitutional:       General: She is not in acute distress.     Appearance: Normal appearance.   Cardiovascular:      Rate and Rhythm: Normal rate and regular rhythm.      Heart sounds: No murmur heard.  Pulmonary:      Breath sounds: Normal breath sounds. No wheezing.   Feet:      Right foot:      Skin integrity: Skin integrity normal.      Toenail Condition: Right toenails are normal.      Left foot:      Skin integrity: Skin integrity normal.      Toenail Condition: Left toenails are normal.      Comments: PULSES OK  Skin:     General: Skin is warm.   Neurological:      Mental Status: She is alert and oriented to person, place, and time.   Psychiatric:         Behavior: Behavior normal.         Assessment/Plan   Problem List Items Addressed This Visit             ICD-10-CM    Depressed F32.A    Diabetes mellitus without complication (Multi) E11.9    " Cardiology Hypothyroid E03.9     Other Visit Diagnoses         Codes    Sleep difficulties    -  Primary G47.9    Pain of right hip joint     M25.551          DISCUSSED PAIN IN R HIP WILL TRY PHYSICAL THERAPY IF NOT RESOLVING FURTHER WORK UPMAY BE NECESSARY, DM IS GOOD CONTROL BP CONTROLLEDRETURN IN 3 MONTHS